# Patient Record
Sex: FEMALE | Race: OTHER
[De-identification: names, ages, dates, MRNs, and addresses within clinical notes are randomized per-mention and may not be internally consistent; named-entity substitution may affect disease eponyms.]

---

## 2017-03-27 ENCOUNTER — APPOINTMENT (OUTPATIENT)
Dept: SPINE | Facility: CLINIC | Age: 62
End: 2017-03-27

## 2017-03-27 VITALS
WEIGHT: 139 LBS | HEART RATE: 79 BPM | SYSTOLIC BLOOD PRESSURE: 136 MMHG | BODY MASS INDEX: 23.16 KG/M2 | DIASTOLIC BLOOD PRESSURE: 76 MMHG | HEIGHT: 65 IN

## 2017-03-27 DIAGNOSIS — Z86.69 PERSONAL HISTORY OF OTHER DISEASES OF THE NERVOUS SYSTEM AND SENSE ORGANS: ICD-10-CM

## 2017-03-27 DIAGNOSIS — M54.5 LOW BACK PAIN: ICD-10-CM

## 2017-03-27 DIAGNOSIS — Z86.39 PERSONAL HISTORY OF OTHER ENDOCRINE, NUTRITIONAL AND METABOLIC DISEASE: ICD-10-CM

## 2017-03-27 DIAGNOSIS — M41.9 SCOLIOSIS, UNSPECIFIED: ICD-10-CM

## 2017-03-27 RX ORDER — SENNOSIDES 8.6 MG
TABLET ORAL
Refills: 0 | Status: ACTIVE | COMMUNITY

## 2017-03-27 RX ORDER — CHOLECALCIFEROL (VITAMIN D3) 1250 MCG
1.25 MG CAPSULE ORAL
Refills: 0 | Status: ACTIVE | COMMUNITY

## 2017-03-27 RX ORDER — MULTIVIT-MIN/FOLIC/VIT K/LYCOP 400-300MCG
1000 TABLET ORAL
Refills: 0 | Status: ACTIVE | COMMUNITY

## 2018-04-12 ENCOUNTER — LABORATORY RESULT (OUTPATIENT)
Age: 63
End: 2018-04-12

## 2018-04-13 ENCOUNTER — APPOINTMENT (OUTPATIENT)
Dept: OBGYN | Facility: CLINIC | Age: 63
End: 2018-04-13
Payer: COMMERCIAL

## 2018-04-13 PROCEDURE — 99396 PREV VISIT EST AGE 40-64: CPT

## 2018-08-17 ENCOUNTER — APPOINTMENT (OUTPATIENT)
Dept: OBGYN | Facility: CLINIC | Age: 63
End: 2018-08-17

## 2018-10-01 ENCOUNTER — APPOINTMENT (OUTPATIENT)
Dept: OPHTHALMOLOGY | Facility: CLINIC | Age: 63
End: 2018-10-01

## 2018-10-03 ENCOUNTER — APPOINTMENT (OUTPATIENT)
Dept: OPHTHALMOLOGY | Facility: CLINIC | Age: 63
End: 2018-10-03
Payer: COMMERCIAL

## 2018-10-03 DIAGNOSIS — H02.402 UNSPECIFIED PTOSIS OF LEFT EYELID: ICD-10-CM

## 2018-10-03 PROCEDURE — 99203 OFFICE O/P NEW LOW 30 MIN: CPT

## 2019-06-20 ENCOUNTER — LABORATORY RESULT (OUTPATIENT)
Age: 64
End: 2019-06-20

## 2019-06-21 ENCOUNTER — APPOINTMENT (OUTPATIENT)
Dept: OBGYN | Facility: CLINIC | Age: 64
End: 2019-06-21
Payer: COMMERCIAL

## 2019-06-21 VITALS — DIASTOLIC BLOOD PRESSURE: 80 MMHG | BODY MASS INDEX: 23.46 KG/M2 | SYSTOLIC BLOOD PRESSURE: 125 MMHG | WEIGHT: 141 LBS

## 2019-06-21 PROCEDURE — 99396 PREV VISIT EST AGE 40-64: CPT

## 2019-06-21 NOTE — PHYSICAL EXAM
[Awake] : awake [Alert] : alert [Soft] : soft [Oriented x3] : oriented to person, place, and time [Normal] : uterus [Uterine Adnexae] : were not tender and not enlarged [No Bleeding] : there was no active vaginal bleeding [Acute Distress] : no acute distress [Mass] : no breast mass [Nipple Discharge] : no nipple discharge [Axillary LAD] : no axillary lymphadenopathy [Tender] : non tender

## 2019-06-21 NOTE — COUNSELING
[Nutrition] : nutrition [Breast Self Exam] : breast self exam [Exercise] : exercise [Vitamins/Supplements] : vitamins/supplements [Vaccines] : vaccines [STD (testing, results, tx)] : STD (testing, results, tx) [Weight Management] : weight management

## 2020-05-26 ENCOUNTER — APPOINTMENT (OUTPATIENT)
Dept: OBGYN | Facility: CLINIC | Age: 65
End: 2020-05-26

## 2020-06-24 ENCOUNTER — APPOINTMENT (OUTPATIENT)
Dept: OBGYN | Facility: CLINIC | Age: 65
End: 2020-06-24

## 2021-03-07 ENCOUNTER — LABORATORY RESULT (OUTPATIENT)
Age: 66
End: 2021-03-07

## 2021-03-08 ENCOUNTER — LABORATORY RESULT (OUTPATIENT)
Age: 66
End: 2021-03-08

## 2021-03-08 ENCOUNTER — APPOINTMENT (OUTPATIENT)
Dept: OBGYN | Facility: CLINIC | Age: 66
End: 2021-03-08
Payer: COMMERCIAL

## 2021-03-08 DIAGNOSIS — N39.0 URINARY TRACT INFECTION, SITE NOT SPECIFIED: ICD-10-CM

## 2021-03-08 PROCEDURE — 99396 PREV VISIT EST AGE 40-64: CPT

## 2021-03-08 PROCEDURE — 99212 OFFICE O/P EST SF 10 MIN: CPT | Mod: 25

## 2021-03-08 PROCEDURE — 99072 ADDL SUPL MATRL&STAF TM PHE: CPT

## 2021-03-08 RX ORDER — MULTIVITAMIN/IRON/FOLIC ACID 18MG-0.4MG
600-400 TABLET ORAL
Qty: 60 | Refills: 10 | Status: ACTIVE | COMMUNITY
Start: 2021-03-08 | End: 1900-01-01

## 2021-03-08 NOTE — HISTORY OF PRESENT ILLNESS
[FreeTextEntry1] : pt comes for pap and mammogram . She has vaginal dryness , dysuria , frequency no hematuria fever or back pains for 5 days ,

## 2021-03-08 NOTE — COUNSELING
[Nutrition/ Exercise/ Weight Management] : nutrition, exercise, weight management [Body Image] : body image [Vitamins/Supplements] : vitamins/supplements [Vaccines] : vaccines

## 2022-03-10 ENCOUNTER — LABORATORY RESULT (OUTPATIENT)
Age: 67
End: 2022-03-10

## 2022-03-11 ENCOUNTER — APPOINTMENT (OUTPATIENT)
Dept: OBGYN | Facility: CLINIC | Age: 67
End: 2022-03-11

## 2022-03-11 ENCOUNTER — APPOINTMENT (OUTPATIENT)
Dept: OBGYN | Facility: CLINIC | Age: 67
End: 2022-03-11
Payer: COMMERCIAL

## 2022-03-11 VITALS — BODY MASS INDEX: 23.46 KG/M2 | SYSTOLIC BLOOD PRESSURE: 151 MMHG | DIASTOLIC BLOOD PRESSURE: 88 MMHG | WEIGHT: 141 LBS

## 2022-03-11 PROCEDURE — 99072 ADDL SUPL MATRL&STAF TM PHE: CPT

## 2022-03-11 PROCEDURE — 99397 PER PM REEVAL EST PAT 65+ YR: CPT

## 2022-06-22 ENCOUNTER — APPOINTMENT (OUTPATIENT)
Dept: NEUROSURGERY | Facility: CLINIC | Age: 67
End: 2022-06-22
Payer: COMMERCIAL

## 2022-06-22 VITALS
DIASTOLIC BLOOD PRESSURE: 81 MMHG | HEIGHT: 65 IN | WEIGHT: 138 LBS | SYSTOLIC BLOOD PRESSURE: 137 MMHG | HEART RATE: 81 BPM | BODY MASS INDEX: 22.99 KG/M2 | TEMPERATURE: 98.4 F

## 2022-06-22 PROCEDURE — 99244 OFF/OP CNSLTJ NEW/EST MOD 40: CPT

## 2022-06-29 NOTE — HISTORY OF PRESENT ILLNESS
[FreeTextEntry1] : Incidental right ICA aneurysm measuring 3-4 mm in size [de-identified] : 67 year old healthy female presents to the office for a neurosurgical consultation for an incidental right ICA aneurysm found on MRI/A imaging. Patient has complaints of headaches which occur for 1-2 weeks at a time. She consulted with a neurologist, Dr. Case,  who ordered MRI/A which demonstrated a 3-4 mm right ICA aneurysm. \par She states there are weeks she is headache free and denies other neurological symptoms. \par PMH Herniated disc, headaches, palpations, \par PSH none\par ALL ASA\par non smoker\par No family history of cerebral aneurysms\par \par Plan: Cerebral angiogram by Dr. LONDON Mccartney end of July\par

## 2022-06-29 NOTE — ASSESSMENT
[FreeTextEntry1] : 2022\par \par \par \par Re:	Mily Brody \par :	1955\par \par The patient is a 67-year-old right-handed healthy female who has had a history of ongoing headaches.  She was seen by a neurologist in [place] who imaged the patient.  The MRI was unremarkable, and the MRA shows a 3-4 mm right-sided supraclinoid aneurysm.  She was sent to a neurosurgeon in [place] who said she had 2 aneurysms, and she is here for my opinion.\par \par Her past medical history is positive for back issues.  She has a history of a murmur and palpitations and has had a cardiac catheterization.  Past surgical history is negative.  She does not smoke.  Family history is negative, and she is allergic to aspirin.  She has a normal neurologic exam.\par \par I reviewed the images carefully, and on the right side there is a small aneurysm of approximately 4 mm in the ophthalmic segment, perhaps a little distal to the ophthalmic.  I see no other aneurysm.  She claims the other neurosurgeon saw a 2nd aneurysm.  I am not clear on this.\par \par IMPRESSION: Given her excellent health and the size of this aneurysm and the question of multiplicity, I think a cerebral angiogram should be performed to completely delineate the lesion and lead to decision making.  She may be in a gray zone for treatment, but I would like to see a cerebral angiogram.  The patient will be seen by Dr. Mccartney, and we are arranging outpatient angiography.\par \par \par \par Jeffry Zhao M.D., F.A.C.S.\par [facility]\par \par \par \par

## 2022-09-14 ENCOUNTER — APPOINTMENT (OUTPATIENT)
Dept: NEUROSURGERY | Facility: CLINIC | Age: 67
End: 2022-09-14

## 2022-09-14 PROCEDURE — 99202 OFFICE O/P NEW SF 15 MIN: CPT | Mod: 95

## 2022-09-14 NOTE — HISTORY OF PRESENT ILLNESS
[de-identified] : SNEHA WILLIS is a 67 year old right handed female referred by Dr. Zhao for neurovascular evaluation and possible diagnostic cerebral angiogram. She has a PMH of herniated disc, chronic headaches, heart murmur and palpitations, s/p cardiac catheterization per patient. Denies known family history of cerebral aneurysms. She initially presented with headaches for 1-2 weeks at a time. She saw neurologist, Dr. Case who ordered MRI/MRA which demonstrated incidental 3-4mm right ICA aneurysm. She saw neurosurgeon, Dr. Garcia in [facility], and was told she has 2 aneurysms and she was recommended surgery. She presented to Dr. Zhao in June 2022 for a second opinion who confirms the presence of one aneurysm, unclear about the alleged second aneurysm.

## 2022-09-14 NOTE — REASON FOR VISIT
[Home] : at home, [unfilled] , at the time of the visit. [Medical Office: ([facility])___] : at the medical office located in  [Family Member] : family member [Patient] : the patient [Self] : self [This encounter was initiated by telehealth (audio with video) and converted to telephone (audio only) due to technical difficulties.] : This encounter was initiated by telehealth (audio with video) and converted to telephone (audio only) due to technical difficulties. [New Patient Visit] : a new patient visit [Referred By: _________] : Patient was referred by BARRIE

## 2022-09-14 NOTE — ASSESSMENT
[FreeTextEntry1] : IMPRESSION:\par 67F with PMH of herniated disc, reported h/o coronary angiogram in the past, p/w headaches, underwent MRI/MRA imaging ordered by neurologist, Dr. Case, incidentally found 3-4mm right ICA aneurysm. Consulted with Dr. Zhao in June 2022. \par \par Recommend diagnostic cerebral angiogram as the initial step to evaluate unruptured aneurysm. The risks, benefits, alternatives, complications and personnel associated with the procedure were discussed with the patient and family in great detail. They request that we proceed. \par \par \par PLAN: \par Diagnostic Cerebral Angiogram scheduled for 9/20/22 at [facility]\par

## 2022-09-16 ENCOUNTER — OUTPATIENT (OUTPATIENT)
Dept: OUTPATIENT SERVICES | Facility: HOSPITAL | Age: 67
LOS: 1 days | End: 2022-09-16
Payer: COMMERCIAL

## 2022-09-16 ENCOUNTER — RESULT REVIEW (OUTPATIENT)
Age: 67
End: 2022-09-16

## 2022-09-16 VITALS
OXYGEN SATURATION: 95 % | TEMPERATURE: 97 F | SYSTOLIC BLOOD PRESSURE: 120 MMHG | DIASTOLIC BLOOD PRESSURE: 80 MMHG | HEIGHT: 65 IN | WEIGHT: 134.92 LBS | RESPIRATION RATE: 16 BRPM | HEART RATE: 54 BPM

## 2022-09-16 DIAGNOSIS — Z98.890 OTHER SPECIFIED POSTPROCEDURAL STATES: Chronic | ICD-10-CM

## 2022-09-16 DIAGNOSIS — Z01.818 ENCOUNTER FOR OTHER PREPROCEDURAL EXAMINATION: ICD-10-CM

## 2022-09-16 DIAGNOSIS — I67.1 CEREBRAL ANEURYSM, NONRUPTURED: ICD-10-CM

## 2022-09-16 DIAGNOSIS — Z29.9 ENCOUNTER FOR PROPHYLACTIC MEASURES, UNSPECIFIED: ICD-10-CM

## 2022-09-16 DIAGNOSIS — Z90.89 ACQUIRED ABSENCE OF OTHER ORGANS: Chronic | ICD-10-CM

## 2022-09-16 LAB
A1C WITH ESTIMATED AVERAGE GLUCOSE RESULT: 5.6 % — SIGNIFICANT CHANGE UP (ref 4–5.6)
ALBUMIN SERPL ELPH-MCNC: 4.2 G/DL — SIGNIFICANT CHANGE UP (ref 3.3–5.2)
ALP SERPL-CCNC: 50 U/L — SIGNIFICANT CHANGE UP (ref 40–120)
ALT FLD-CCNC: 15 U/L — SIGNIFICANT CHANGE UP
ANION GAP SERPL CALC-SCNC: 10 MMOL/L — SIGNIFICANT CHANGE UP (ref 5–17)
APTT BLD: 31.6 SEC — SIGNIFICANT CHANGE UP (ref 27.5–35.5)
AST SERPL-CCNC: 22 U/L — SIGNIFICANT CHANGE UP
BASOPHILS # BLD AUTO: 0.02 K/UL — SIGNIFICANT CHANGE UP (ref 0–0.2)
BASOPHILS NFR BLD AUTO: 0.4 % — SIGNIFICANT CHANGE UP (ref 0–2)
BILIRUB SERPL-MCNC: 0.3 MG/DL — LOW (ref 0.4–2)
BLD GP AB SCN SERPL QL: SIGNIFICANT CHANGE UP
BUN SERPL-MCNC: 13.5 MG/DL — SIGNIFICANT CHANGE UP (ref 8–20)
CALCIUM SERPL-MCNC: 9.1 MG/DL — SIGNIFICANT CHANGE UP (ref 8.4–10.5)
CHLORIDE SERPL-SCNC: 104 MMOL/L — SIGNIFICANT CHANGE UP (ref 98–107)
CO2 SERPL-SCNC: 27 MMOL/L — SIGNIFICANT CHANGE UP (ref 22–29)
CREAT SERPL-MCNC: 0.58 MG/DL — SIGNIFICANT CHANGE UP (ref 0.5–1.3)
EGFR: 100 ML/MIN/1.73M2 — SIGNIFICANT CHANGE UP
EOSINOPHIL # BLD AUTO: 0.07 K/UL — SIGNIFICANT CHANGE UP (ref 0–0.5)
EOSINOPHIL NFR BLD AUTO: 1.4 % — SIGNIFICANT CHANGE UP (ref 0–6)
ESTIMATED AVERAGE GLUCOSE: 114 MG/DL — SIGNIFICANT CHANGE UP (ref 68–114)
GLUCOSE SERPL-MCNC: 87 MG/DL — SIGNIFICANT CHANGE UP (ref 70–99)
HCT VFR BLD CALC: 42 % — SIGNIFICANT CHANGE UP (ref 34.5–45)
HGB BLD-MCNC: 13.9 G/DL — SIGNIFICANT CHANGE UP (ref 11.5–15.5)
IMM GRANULOCYTES NFR BLD AUTO: 0.2 % — SIGNIFICANT CHANGE UP (ref 0–0.9)
INR BLD: 1.03 RATIO — SIGNIFICANT CHANGE UP (ref 0.88–1.16)
LYMPHOCYTES # BLD AUTO: 1.84 K/UL — SIGNIFICANT CHANGE UP (ref 1–3.3)
LYMPHOCYTES # BLD AUTO: 36.4 % — SIGNIFICANT CHANGE UP (ref 13–44)
MCHC RBC-ENTMCNC: 30.5 PG — SIGNIFICANT CHANGE UP (ref 27–34)
MCHC RBC-ENTMCNC: 33.1 GM/DL — SIGNIFICANT CHANGE UP (ref 32–36)
MCV RBC AUTO: 92.3 FL — SIGNIFICANT CHANGE UP (ref 80–100)
MONOCYTES # BLD AUTO: 0.35 K/UL — SIGNIFICANT CHANGE UP (ref 0–0.9)
MONOCYTES NFR BLD AUTO: 6.9 % — SIGNIFICANT CHANGE UP (ref 2–14)
MRSA PCR RESULT.: SIGNIFICANT CHANGE UP
NEUTROPHILS # BLD AUTO: 2.77 K/UL — SIGNIFICANT CHANGE UP (ref 1.8–7.4)
NEUTROPHILS NFR BLD AUTO: 54.7 % — SIGNIFICANT CHANGE UP (ref 43–77)
PLATELET # BLD AUTO: 256 K/UL — SIGNIFICANT CHANGE UP (ref 150–400)
POTASSIUM SERPL-MCNC: 3.8 MMOL/L — SIGNIFICANT CHANGE UP (ref 3.5–5.3)
POTASSIUM SERPL-SCNC: 3.8 MMOL/L — SIGNIFICANT CHANGE UP (ref 3.5–5.3)
PROT SERPL-MCNC: 6.8 G/DL — SIGNIFICANT CHANGE UP (ref 6.6–8.7)
PROTHROM AB SERPL-ACNC: 11.9 SEC — SIGNIFICANT CHANGE UP (ref 10.5–13.4)
RBC # BLD: 4.55 M/UL — SIGNIFICANT CHANGE UP (ref 3.8–5.2)
RBC # FLD: 13.1 % — SIGNIFICANT CHANGE UP (ref 10.3–14.5)
S AUREUS DNA NOSE QL NAA+PROBE: SIGNIFICANT CHANGE UP
SARS-COV-2 RNA SPEC QL NAA+PROBE: SIGNIFICANT CHANGE UP
SODIUM SERPL-SCNC: 141 MMOL/L — SIGNIFICANT CHANGE UP (ref 135–145)
WBC # BLD: 5.06 K/UL — SIGNIFICANT CHANGE UP (ref 3.8–10.5)
WBC # FLD AUTO: 5.06 K/UL — SIGNIFICANT CHANGE UP (ref 3.8–10.5)

## 2022-09-16 PROCEDURE — 93005 ELECTROCARDIOGRAM TRACING: CPT

## 2022-09-16 PROCEDURE — 93010 ELECTROCARDIOGRAM REPORT: CPT

## 2022-09-16 PROCEDURE — 71046 X-RAY EXAM CHEST 2 VIEWS: CPT | Mod: 26

## 2022-09-16 PROCEDURE — 71046 X-RAY EXAM CHEST 2 VIEWS: CPT

## 2022-09-16 PROCEDURE — G0463: CPT

## 2022-09-16 RX ORDER — AMITRIPTYLINE HCL 25 MG
0 TABLET ORAL
Qty: 0 | Refills: 0 | DISCHARGE

## 2022-09-16 RX ORDER — PROPRANOLOL HCL 160 MG
0 CAPSULE, EXTENDED RELEASE 24HR ORAL
Qty: 0 | Refills: 0 | DISCHARGE

## 2022-09-16 RX ORDER — SODIUM CHLORIDE 9 MG/ML
3 INJECTION INTRAMUSCULAR; INTRAVENOUS; SUBCUTANEOUS ONCE
Refills: 0 | Status: DISCONTINUED | OUTPATIENT
Start: 2022-09-20 | End: 2022-10-04

## 2022-09-16 RX ORDER — MONTELUKAST 4 MG/1
0 TABLET, CHEWABLE ORAL
Qty: 0 | Refills: 0 | DISCHARGE

## 2022-09-16 NOTE — H&P PST ADULT - NSICDXPASTMEDICALHX_GEN_ALL_CORE_FT
PAST MEDICAL HISTORY:  Back pain     History of cerebral aneurysm     Lumbar herniated disc     Osteoporosis      PAST MEDICAL HISTORY:  Back pain     History of cerebral aneurysm     History of recurrent UTIs Currently on ABX therapy for 14 days    Lumbar herniated disc     Osteoporosis     Thyroid nodule

## 2022-09-16 NOTE — H&P PST ADULT - ASSESSMENT
CAPRINI VTE 2.0 SCORE [CLOT updated 2019]    AGE RELATED RISK FACTORS                                                       MOBILITY RELATED FACTORS  [ ] Age 41-60 years                                            (1 Point)                    [ ] Bed rest                                                        (1 Point)  [x ] Age: 61-74 years                                           (2 Points)                  [ ] Plaster cast                                                   (2 Points)  [ ] Age= 75 years                                              (3 Points)                    [ ] Bed bound for more than 72 hours                 (2 Points)    DISEASE RELATED RISK FACTORS                                               GENDER SPECIFIC FACTORS  [ ] Edema in the lower extremities                       (1 Point)              [ ] Pregnancy                                                     (1 Point)  [ ] Varicose veins                                               (1 Point)                     [ ] Post-partum < 6 weeks                                   (1 Point)             [x ] BMI > 25 Kg/m2                                            (1 Point)                     [ ] Hormonal therapy  or oral contraception          (1 Point)                 [ ] Sepsis (in the previous month)                        (1 Point)               [ ] History of pregnancy complications                 (1 point)  [ ] Pneumonia or serious lung disease                                               [ ] Unexplained or recurrent                     (1 Point)           (in the previous month)                               (1 Point)  [ ] Abnormal pulmonary function test                     (1 Point)                 SURGERY RELATED RISK FACTORS  [ ] Acute myocardial infarction                              (1 Point)               [ ]  Section                                             (1 Point)  [ ] Congestive heart failure (in the previous month)  (1 Point)      [ ] Minor surgery                                                  (1 Point)   [ ] Inflammatory bowel disease                             (1 Point)               [ ] Arthroscopic surgery                                        (2 Points)  [ ] Central venous access                                      (2 Points)                [ x] General surgery lasting more than 45 minutes (2 points)  [ ] Malignancy- Present or previous                   (2 Points)                [ ] Elective arthroplasty                                         (5 points)    [ ] Stroke (in the previous month)                          (5 Points)                                                                                                                                                           HEMATOLOGY RELATED FACTORS                                                 TRAUMA RELATED RISK FACTORS  [ ] Prior episodes of VTE                                     (3 Points)                [ ] Fracture of the hip, pelvis, or leg                       (5 Points)  [ ] Positive family history for VTE                         (3 Points)             [ ] Acute spinal cord injury (in the previous month)  (5 Points)  [ ] Prothrombin 11603 A                                     (3 Points)               [ ] Paralysis  (less than 1 month)                             (5 Points)  [ ] Factor V Leiden                                             (3 Points)                  [ ] Multiple Trauma within 1 month                        (5 Points)  [ ] Lupus anticoagulants                                     (3 Points)                                                           [ ] Anticardiolipin antibodies                               (3 Points)                                                       [ ] High homocysteine in the blood                      (3 Points)                                             [ ] Other congenital or acquired thrombophilia      (3 Points)                                                [ ] Heparin induced thrombocytopenia                  (3 Points)                                     Total Score [   5       ]  OPIOID RISK TOOL    KANCHAN EACH BOX THAT APPLIES AND ADD TOTALS AT THE END    FAMILY HISTORY OF SUBSTANCE ABUSE                 FEMALE         MALE                                                Alcohol                             [  ]1 pt          [  ]3pts                                               Illegal Durgs                     [  ]2 pts        [  ]3pts                                               Rx Drugs                           [  ]4 pts        [  ]4 pts    PERSONAL HISTORY OF SUBSTANCE ABUSE                                                                                          Alcohol                             [  ]3 pts       [  ]3 pts                                               Illegal Drugs                     [  ]4 pts        [  ]4 pts                                               Rx Drugs                           [  ]5 pts        [  ]5 pts    AGE BETWEEN 16-45 YEARS                                      [  ]1 pt         [  ]1 pt    HISTORY OF PREADOLESCENT   SEXUAL ABUSE                                                             [  ]3 pts        [  ]0pts    PSYCHOLOGICAL DISEASE                     ADD, OCD, Bipolar, Schizophrenia        [  ]2 pts         [  ]2 pts                      Depression                                               [  ]1 pt           [  ]1 pt           SCORING TOTAL   (add numbers and type here)              (**0*)                                     A score of 3 or lower indicated LOW risk for future opioid abuse  A score of 4 to 7 indicated moderate risk for future opioid abuse  A score of 8 or higher indicates a high risk for opioid abuse Pt is a 67 years old female, right hand dominant, seen today pre-op  for Cerebral angiogram. Pt medical hx includes OA, Osteoporosis, chronic back pain, cerebral aneurysm and headaches. Pt report incidental right ICA aneurysm found on MRI/A. Pt report occasional headaches which occurs for 1-2 weeks at times,  subsequently her neurologist Dr. Thom Case  ordered MRI/A which revealed a 3-4mm right ICA aneurysm. Pt states her last episode of headaches was  this morning at about 0400. Pt denies further symptoms of headaches at this time, denies syncope/ pre-syncope, no change visual disturbance, no nausea/emesis, no Fever/chills. Pt seen today for a scheduled procedure on 2022 with Dr. Bib Villalobos. Procedure protocol reviewed with Pt today. Pt had her COVID PCR done today in PST.       CAPRINI VTE 2.0 SCORE [CLOT updated 2019]    AGE RELATED RISK FACTORS                                                       MOBILITY RELATED FACTORS  [ ] Age 41-60 years                                            (1 Point)                    [ ] Bed rest                                                        (1 Point)  [x ] Age: 61-74 years                                           (2 Points)                  [ ] Plaster cast                                                   (2 Points)  [ ] Age= 75 years                                              (3 Points)                    [ ] Bed bound for more than 72 hours                 (2 Points)    DISEASE RELATED RISK FACTORS                                               GENDER SPECIFIC FACTORS  [ ] Edema in the lower extremities                       (1 Point)              [ ] Pregnancy                                                     (1 Point)  [ ] Varicose veins                                               (1 Point)                     [ ] Post-partum < 6 weeks                                   (1 Point)             [x ] BMI > 25 Kg/m2                                            (1 Point)                     [ ] Hormonal therapy  or oral contraception          (1 Point)                 [ ] Sepsis (in the previous month)                        (1 Point)               [ ] History of pregnancy complications                 (1 point)  [ ] Pneumonia or serious lung disease                                               [ ] Unexplained or recurrent                     (1 Point)           (in the previous month)                               (1 Point)  [ ] Abnormal pulmonary function test                     (1 Point)                 SURGERY RELATED RISK FACTORS  [ ] Acute myocardial infarction                              (1 Point)               [ ]  Section                                             (1 Point)  [ ] Congestive heart failure (in the previous month)  (1 Point)      [ ] Minor surgery                                                  (1 Point)   [ ] Inflammatory bowel disease                             (1 Point)               [ ] Arthroscopic surgery                                        (2 Points)  [ ] Central venous access                                      (2 Points)                [ x] General surgery lasting more than 45 minutes (2 points)  [ ] Malignancy- Present or previous                   (2 Points)                [ ] Elective arthroplasty                                         (5 points)    [ ] Stroke (in the previous month)                          (5 Points)                                                                                                                                                           HEMATOLOGY RELATED FACTORS                                                 TRAUMA RELATED RISK FACTORS  [ ] Prior episodes of VTE                                     (3 Points)                [ ] Fracture of the hip, pelvis, or leg                       (5 Points)  [ ] Positive family history for VTE                         (3 Points)             [ ] Acute spinal cord injury (in the previous month)  (5 Points)  [ ] Prothrombin 50304 A                                     (3 Points)               [ ] Paralysis  (less than 1 month)                             (5 Points)  [ ] Factor V Leiden                                             (3 Points)                  [ ] Multiple Trauma within 1 month                        (5 Points)  [ ] Lupus anticoagulants                                     (3 Points)                                                           [ ] Anticardiolipin antibodies                               (3 Points)                                                       [ ] High homocysteine in the blood                      (3 Points)                                             [ ] Other congenital or acquired thrombophilia      (3 Points)                                                [ ] Heparin induced thrombocytopenia                  (3 Points)                                     Total Score [   5       ]  OPIOID RISK TOOL    KANCHAN EACH BOX THAT APPLIES AND ADD TOTALS AT THE END    FAMILY HISTORY OF SUBSTANCE ABUSE                 FEMALE         MALE                                                Alcohol                             [  ]1 pt          [  ]3pts                                               Illegal Durgs                     [  ]2 pts        [  ]3pts                                               Rx Drugs                           [  ]4 pts        [  ]4 pts    PERSONAL HISTORY OF SUBSTANCE ABUSE                                                                                          Alcohol                             [  ]3 pts       [  ]3 pts                                               Illegal Drugs                     [  ]4 pts        [  ]4 pts                                               Rx Drugs                           [  ]5 pts        [  ]5 pts    AGE BETWEEN 16-45 YEARS                                      [  ]1 pt         [  ]1 pt    HISTORY OF PREADOLESCENT   SEXUAL ABUSE                                                             [  ]3 pts        [  ]0pts    PSYCHOLOGICAL DISEASE                     ADD, OCD, Bipolar, Schizophrenia        [  ]2 pts         [  ]2 pts                      Depression                                               [  ]1 pt           [  ]1 pt           SCORING TOTAL   (add numbers and type here)              (**0*)                                     A score of 3 or lower indicated LOW risk for future opioid abuse  A score of 4 to 7 indicated moderate risk for future opioid abuse  A score of 8 or higher indicates a high risk for opioid abuse

## 2022-09-16 NOTE — H&P PST ADULT - NSANTHOBSERVEDRD_ENT_A_CORE
----- Message from Santos Das MD sent at 12/12/2019  4:38 PM CST -----  Please inform the patient that her urinalysis does not show an infection, nor blood which should be indicative of stones.  Her kidney function is normal, her liver function is normal, and her blood sugar is again slightly elevated indicative of insulin sensitivity, and she should continue to try to stay active stay very well hydrated, and eat healthfully.  
Patient was informed with results and Dr. Das's suggestions. Patient verbalized understanding and has no questions.   
No

## 2022-09-16 NOTE — H&P PST ADULT - HISTORY OF PRESENT ILLNESS
Pt is a 67 years old female seen today pre-op  for Cerebral angiogram. Pt medical hx includes OA, Osteoporosis, chronic back pain, cerebral aneurysm. Pt report incidental right ICA aneurysm found on MRI/A. Pt report occasional headaches which occurs for 1-2 weeks at time, subsequently her neurologist ordered MRI/A which revealed a 3-4mm right ICA aneurysm. Pt states her last epsiode of headaches  Pt is a 67 years old female right hand dominant, seen today pre-op  for Cerebral angiogram. Pt medical hx includes OA, Osteoporosis, chronic back pain, cerebral aneurysm. Pt report incidental right ICA aneurysm found on MRI/A. Pt report occasional headaches which occurs for 1-2 weeks at time, subsequently her neurologist ordered MRI/A which revealed a 3-4mm right ICA aneurysm. Pt states her last episode of headaches this morning at about 0400. No syncope, no pre-syncope, no change visual disturbance  Pt is a 67 years old female, right hand dominant, seen today pre-op  for Cerebral angiogram. Pt medical hx includes OA, Osteoporosis, chronic back pain, Thyroid nodules, cerebral aneurysm and headaches. Pt report incidental right ICA aneurysm found on MRI/A. Pt report occasional headaches which occurs for 1-2 weeks at times,  subsequently her neurologist Dr. Thom Case  ordered MRI/A which revealed a 3-4mm right ICA aneurysm. Pt states her last episode of headaches was  this morning at about 0400. Pt denies further symptoms of headaches at this time, denies syncope/ pre-syncope, no change visual disturbance, no nausea/emesis, no Fever/chills. Pt seen today for a scheduled procedure on 9/2022 with Dr. Bib Villalobos.

## 2022-09-16 NOTE — H&P PST ADULT - NSANTHOSAYNRD_GEN_A_CORE
No. ASAD screening performed.  STOP BANG Legend: 0-2 = LOW Risk; 3-4 = INTERMEDIATE Risk; 5-8 = HIGH Risk

## 2022-09-16 NOTE — H&P PST ADULT - OTHER CARE PROVIDERS
Dr. Grijalva [phone], Nerologist Dr. Case Dr. Grijalva [phone], Neurologist Dr. Thom Case, Neurologist Dr. Jeffry Zhao

## 2022-09-16 NOTE — H&P PST ADULT - MUSCULOSKELETAL
details… normal/ROM intact/normal gait/strength 5/5 bilateral upper extremities/strength 5/5 bilateral lower extremities/back exam

## 2022-09-16 NOTE — H&P PST ADULT - GENITOURINARY COMMENTS
UTI. Pt currently on ABT Not assessed Dx with UTI, currently on antibiotics till date, no further symptoms of dysuria, frequency

## 2022-09-16 NOTE — H&P PST ADULT - NSICDXFAMILYHX_GEN_ALL_CORE_FT
FAMILY HISTORY:  Mother  Still living? No  FH: arterial aneurysm, Age at diagnosis: Age Unknown    Aunt  Still living? Unknown  FH: arterial aneurysm, Age at diagnosis: Age Unknown

## 2022-09-20 ENCOUNTER — TRANSCRIPTION ENCOUNTER (OUTPATIENT)
Age: 67
End: 2022-09-20

## 2022-09-20 ENCOUNTER — OUTPATIENT (OUTPATIENT)
Dept: OUTPATIENT SERVICES | Facility: HOSPITAL | Age: 67
LOS: 1 days | End: 2022-09-20
Payer: COMMERCIAL

## 2022-09-20 ENCOUNTER — APPOINTMENT (OUTPATIENT)
Dept: NEUROSURGERY | Facility: HOSPITAL | Age: 67
End: 2022-09-20

## 2022-09-20 VITALS
HEART RATE: 72 BPM | SYSTOLIC BLOOD PRESSURE: 122 MMHG | RESPIRATION RATE: 18 BRPM | DIASTOLIC BLOOD PRESSURE: 77 MMHG | OXYGEN SATURATION: 98 %

## 2022-09-20 VITALS
DIASTOLIC BLOOD PRESSURE: 84 MMHG | RESPIRATION RATE: 16 BRPM | SYSTOLIC BLOOD PRESSURE: 154 MMHG | TEMPERATURE: 98 F | OXYGEN SATURATION: 100 % | HEART RATE: 59 BPM

## 2022-09-20 DIAGNOSIS — Z90.89 ACQUIRED ABSENCE OF OTHER ORGANS: Chronic | ICD-10-CM

## 2022-09-20 DIAGNOSIS — I67.1 CEREBRAL ANEURYSM, NONRUPTURED: ICD-10-CM

## 2022-09-20 DIAGNOSIS — Z98.890 OTHER SPECIFIED POSTPROCEDURAL STATES: Chronic | ICD-10-CM

## 2022-09-20 PROCEDURE — 36224 PLACE CATH CAROTD ART: CPT

## 2022-09-20 PROCEDURE — C1887: CPT

## 2022-09-20 PROCEDURE — 76377 3D RENDER W/INTRP POSTPROCES: CPT | Mod: 26

## 2022-09-20 PROCEDURE — 36227 PLACE CATH XTRNL CAROTID: CPT

## 2022-09-20 PROCEDURE — 36226 PLACE CATH VERTEBRAL ART: CPT | Mod: 50

## 2022-09-20 PROCEDURE — 36226 PLACE CATH VERTEBRAL ART: CPT

## 2022-09-20 PROCEDURE — 36224 PLACE CATH CAROTD ART: CPT | Mod: 26

## 2022-09-20 PROCEDURE — C1894: CPT

## 2022-09-20 PROCEDURE — C1760: CPT

## 2022-09-20 PROCEDURE — C1769: CPT

## 2022-09-20 RX ORDER — SODIUM CHLORIDE 9 MG/ML
1000 INJECTION INTRAMUSCULAR; INTRAVENOUS; SUBCUTANEOUS
Refills: 0 | Status: DISCONTINUED | OUTPATIENT
Start: 2022-09-20 | End: 2022-10-04

## 2022-09-20 RX ADMIN — SODIUM CHLORIDE 100 MILLILITER(S): 9 INJECTION INTRAMUSCULAR; INTRAVENOUS; SUBCUTANEOUS at 15:54

## 2022-09-20 NOTE — DISCHARGE NOTE NURSING/CASE MANAGEMENT/SOCIAL WORK - PATIENT PORTAL LINK FT
You can access the FollowMyHealth Patient Portal offered by [facility] by registering at the following website: [email or web address]. By joining [facility]’s FollowMyHealth portal, you will also be able to view your health information using other applications (apps) compatible with our system.

## 2022-09-20 NOTE — CHART NOTE - NSCHARTNOTESELECT_GEN_ALL_CORE
Neurointerventional Surgery Pre Procedure Note/Event Note
Neurointerventional Surgery Post Procedure Note/Event Note

## 2022-09-20 NOTE — DISCHARGE NOTE NURSING/CASE MANAGEMENT/SOCIAL WORK - NSDCPEFALRISK_GEN_ALL_CORE
For information on Fall & Injury Prevention, visit: [email or web address] OR  [email or web address] OR  https://www.cdc.gov/steadi/patient.html

## 2022-09-20 NOTE — ASU DISCHARGE PLAN (ADULT/PEDIATRIC) - CARE PROVIDER_API CALL
Wilfredo Mccartney (MD; MS)  Unallocated  [address] [facility], [address] [place] [id number]  Phone: [phone]  Fax: [phone]  Follow Up Time: 2 weeks

## 2022-09-20 NOTE — CHART NOTE - NSCHARTNOTEFT_GEN_A_CORE
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 Bib SIERRA  Physician Assistant: Ashlie Crowder PA-C  Nurse: Elvia Avitia, Rina Stafford, Yogesh Curry  Anesthesiologist: Dr. Arias SIERRA       Radiology Tech: Wesley Oakes RT, Jean Paul Cornell RT, Clay Sage RT    Sheath:  5 [place] Sheath    I/Os: estimated blood loss less than 10cc,  IV fluids 300cc, Urine output 0cc, Contrast: Omnipaque 240  90 cc    Vitals:  /79   HR 68  Spo2 100 %    Preliminary Report:  Under a 5 [place]oc[place] short sheath via the right groin under MAC sedation via left vertebral artery, left internal carotid artery, left external carotid artery, right vertebral artery, right internal carotid artery, right external carotid artery, a selective cerebral angiography  was performed and reveals FMD, 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 ( Official note to follow).    Patient tolerated procedure well.  Patient remains hemodynamically stable, no change in neurological status compared to baseline.  Results were discussed with patient, patient's family and Neurosurgery.  Right groin sheath  was discontinued using Vascade closure device. Hemostasis was obtained with approximately 10 minutes of manual compression.     No active bleeding, no hematoma, no ecchymosis.   Quick clot and safeguard balloon dressing applied at 1425.  Patient transferred to recovery room in stable condition.

## 2022-09-20 NOTE — CHART NOTE - NSCHARTNOTEFT_GEN_A_CORE
Neurointerventional Surgery  Pre-Procedure Note     This is a 67y Female        HPI:  67F R hand dominant female with PMH OA, osteoporosis, chronic back pain, thyroid nodules, and headaches who presents today for cerebral angiogram. Patient reports that she has history of headaches which occur for 1-2 weeks at a time. Patient sought care from a neurologist who ordered and MRI/MRA which found a 3-4 mm R ICA aneurysm. Patient presents today for diagnostic angiogram. Patient has no acute complaints at this time.       Allergies: aspirin (Short breath)      PAST MEDICAL & SURGICAL HISTORY:  Lumbar herniated disc  Osteoporosis  Back pain  History of cerebral aneurysm  Thyroid nodule  History of recurrent UTIs, Currently on ABX therapy for 14 days  H/O coronary angiogram  History of tonsillectomy        FAMILY HISTORY:  FH: arterial aneurysm (Mother, Aunt)      Current Medications:   sodium chloride 0.9% lock flush 3 milliLiter(s) IV Push Once      Physical Exam:  Constitutional: NAD  Neuro  * Mental Status:  GCS 15:  E(4), V(5), M(6).  Awake, alert, oriented to conversation. No aphasia or dysarthria. Able to name objects and their function.   * Cranial Nerves: Cnii-Cnxii grossly intact. PERRL, EOMI, tongue midline, no gaze deviation  * Motor: RUE 5/5, LUE 5/5, RLE 5/5, LLE 5/5, no drift or dysmetria   * Sensory: Sensation intact to light touch  * Reflexes: Not assessed  * Gait: Not assessed  Cardiovascular:  Regular rate and rhythm.  Eyes: See neurologic examination with detailed examination of eyes.  ENT: No JVD, Trachea Midline.  Respiratory: non labored breathing   Gastrointestinal: Soft, nontender, nondistended.  Genitourinary: [ ] Ty, [ x ] No Ty.   Musculoskeletal: No muscle wasting noted, (See neurologic assessment for full muscle strength assessment) No pretibial edema appreciated, no appreciable calf tenderness.  Skin: no wounds, abrasions noted   Hematologic / Lymph / Immunologic: No bleeding from IV sites or wounds, No lymphadenopathy, No Hives or allergic type skin lesions      NIH SS:  DATE: 9/20/22  TIME: 1240   1A: Level of consciousness (0-3): 0  1B: Questions (0-2): 0    1C: Commands (0-2): 0  2: Gaze (0-2): 0  3: Visual fields (0-3): 0  4: Facial palsy (0-3): 0  MOTOR:  5A: Left arm motor drift (0-4): 0  5B: Right arm motor drift (0-4): 0  6A: Left leg motor drift (0-4): 0  6B: Right leg motor drift (0-4): 0  7: Limb ataxia (0-2): 0  SENSORY:  8: Sensation (0-2): 0  SPEECH:  9: Language (0-3): 0  10: Dysarthria (0-2): 0  EXTINCTION:  11: Extinction/inattention (0-2): 0    TOTAL SCORE: 4      Labs:   9/16/22: WBC 5.1, Hgb 13.9, Hct 42, platelet 256  9/16/22: Na 141, K 3.8, Cl 104, CO2 27, BUN 13.5, Cr 0.58, glucose 87  9/16/22: PTT 31.6, INR 1.03       Assessment/Plan:   This is a 67y  year old right hand dominant Female  presents with R ICA aneurysm. Patient presents to neuro-IR for selective cerebral angiography.     Procedure, goals, risks, benefits and alternatives  were discussed with patient.  All questions were answered.  Risks include but are not limited to stroke, vessel injury, hemorrhage, and or groin hematoma.  Patient demonstrates understanding  of all risks involved with this procedure and wishes to continue.   Appropriate  consent was obtained from patient and consent is in the patient's chart. Neurointerventional Surgery  Pre-Procedure Note     This is a 67y Female        HPI:  67F R hand dominant female with PMH OA, osteoporosis, chronic back pain, thyroid nodules, and headaches who presents today for cerebral angiogram. Patient reports that she has history of headaches which occur for 1-2 weeks at a time. Patient sought care from a neurologist who ordered and MRI/MRA which found a 3-4 mm R ICA aneurysm. Patient presents today for diagnostic angiogram. Patient has no acute complaints at this time.       Allergies: aspirin (Short breath)      PAST MEDICAL & SURGICAL HISTORY:  Lumbar herniated disc  Osteoporosis  Back pain  History of cerebral aneurysm  Thyroid nodule  History of recurrent UTIs, Currently on ABX therapy for 14 days  H/O coronary angiogram  History of tonsillectomy        FAMILY HISTORY:  FH: arterial aneurysm (Mother, Aunt)      Current Medications:   sodium chloride 0.9% lock flush 3 milliLiter(s) IV Push Once      Physical Exam:  Constitutional: NAD  Neuro  * Mental Status:  GCS 15:  E(4), V(5), M(6).  Awake, alert, oriented to conversation. No aphasia or dysarthria. Able to name objects and their function.   * Cranial Nerves: Cnii-Cnxii grossly intact. PERRL, EOMI, tongue midline, no gaze deviation  * Motor: RUE 5/5, LUE 5/5, RLE 5/5, LLE 5/5, no drift or dysmetria   * Sensory: Sensation intact to light touch  * Reflexes: Not assessed  * Gait: Not assessed  Cardiovascular:  Regular rate and rhythm.  Eyes: See neurologic examination with detailed examination of eyes.  ENT: No JVD, Trachea Midline.  Respiratory: non labored breathing   Gastrointestinal: Soft, nontender, nondistended.  Genitourinary: [ ] Ty, [ x ] No Ty.   Musculoskeletal: No muscle wasting noted, (See neurologic assessment for full muscle strength assessment) No pretibial edema appreciated, no appreciable calf tenderness.  Skin: no wounds, abrasions noted   Hematologic / Lymph / Immunologic: No bleeding from IV sites or wounds, No lymphadenopathy, No Hives or allergic type skin lesions      NIH SS:  DATE: 9/20/22  TIME: 1240   1A: Level of consciousness (0-3): 0  1B: Questions (0-2): 0    1C: Commands (0-2): 0  2: Gaze (0-2): 0  3: Visual fields (0-3): 0  4: Facial palsy (0-3): 0  MOTOR:  5A: Left arm motor drift (0-4): 0  5B: Right arm motor drift (0-4): 0  6A: Left leg motor drift (0-4): 0  6B: Right leg motor drift (0-4): 0  7: Limb ataxia (0-2): 0  SENSORY:  8: Sensation (0-2): 0  SPEECH:  9: Language (0-3): 0  10: Dysarthria (0-2): 0  EXTINCTION:  11: Extinction/inattention (0-2): 0    TOTAL SCORE: 0      Labs:   9/16/22: WBC 5.1, Hgb 13.9, Hct 42, platelet 256  9/16/22: Na 141, K 3.8, Cl 104, CO2 27, BUN 13.5, Cr 0.58, glucose 87  9/16/22: PTT 31.6, INR 1.03       Assessment/Plan:   This is a 67y  year old right hand dominant Female  presents with R ICA aneurysm. Patient presents to neuro-IR for selective cerebral angiography.     Procedure, goals, risks, benefits and alternatives  were discussed with patient.  All questions were answered.  Risks include but are not limited to stroke, vessel injury, hemorrhage, and or groin hematoma.  Patient demonstrates understanding  of all risks involved with this procedure and wishes to continue.   Appropriate  consent was obtained from patient and consent is in the patient's chart.

## 2022-09-29 PROBLEM — M51.26 OTHER INTERVERTEBRAL DISC DISPLACEMENT, LUMBAR REGION: Chronic | Status: ACTIVE | Noted: 2022-09-16

## 2022-09-29 PROBLEM — Z86.79 PERSONAL HISTORY OF OTHER DISEASES OF THE CIRCULATORY SYSTEM: Chronic | Status: ACTIVE | Noted: 2022-09-16

## 2022-09-29 PROBLEM — E04.1 NONTOXIC SINGLE THYROID NODULE: Chronic | Status: ACTIVE | Noted: 2022-09-16

## 2022-09-29 PROBLEM — M54.9 DORSALGIA, UNSPECIFIED: Chronic | Status: ACTIVE | Noted: 2022-09-16

## 2022-09-29 PROBLEM — M81.0 AGE-RELATED OSTEOPOROSIS WITHOUT CURRENT PATHOLOGICAL FRACTURE: Chronic | Status: ACTIVE | Noted: 2022-09-16

## 2022-10-04 ENCOUNTER — APPOINTMENT (OUTPATIENT)
Dept: NEUROSURGERY | Facility: CLINIC | Age: 67
End: 2022-10-04

## 2022-10-04 VITALS
TEMPERATURE: 98.1 F | HEART RATE: 77 BPM | HEIGHT: 65 IN | DIASTOLIC BLOOD PRESSURE: 84 MMHG | SYSTOLIC BLOOD PRESSURE: 133 MMHG | BODY MASS INDEX: 22.16 KG/M2 | OXYGEN SATURATION: 96 % | WEIGHT: 133 LBS

## 2022-10-04 PROCEDURE — 99215 OFFICE O/P EST HI 40 MIN: CPT

## 2022-10-04 PROCEDURE — ZZZZZ: CPT

## 2022-10-05 NOTE — ASSESSMENT
[FreeTextEntry1] : IMPRESSION:\par 67F referred by Dr Zhao, [facility] of herniated disc, reported h/o coronary angiogram in the past, p/w headaches, underwent MRI/MRA imaging ordered by neurologist, Dr. Case, incidentally found 3-4mm right ICA aneurysm. s/p dx angio at [facility] on 9/20 demonstrating bilateral ophthalmic artery aneurysms R 3.2 x 2.9mm, L 4.1 x 3.4mm, 2 right cavernous carotid aneurysms 2.4mm and 1.9mm, and severe fibromuscular dysplasia involving the bilateral cervical internal carotid and vertebral arteries.\par \par Today along with Dr. Zhao we discussed the findings of the angiogram and the treatment options for her aneurysms. We explained that particularly on the left side, the size and morphology of the aneurysm warrants treatment. We discussed the various options of surgical clipping vs endovascular remodeling with flow diverting stents, and recommended the latter. The FMD can be treated conservatively with antiplatelet medication. \par \par Given her family history of thoracic and aortic dissections/aneurysms, will obtain a screening CTA of the chest and abdomen. She also reported a possible allergy to aspirin, but saw an allergist and found that she was not in fact allergic. \par \par \par PLAN:\par - Plan for eventual treatment of the left ophthalmic aneurysm with flow diverting stent\par - CTA chest and abdomen \par - Patient will obtain the note from the allergist to ensure that she does not have an allergy to aspirin \par - When patient is ready to proceed, will prescribe aspirin 81mg daily and brilinta 90mg bid 1 week prior to procedure \par

## 2022-10-05 NOTE — HISTORY OF PRESENT ILLNESS
[FreeTextEntry1] : SNEHA WILLIS is a 67 year old right handed female referred by Dr. Zhao, [facility] of herniated disc, chronic headaches, heart murmur and palpitations, s/p cardiac catheterization per patient. Denies known family history of cerebral aneurysms. She initially presented with headaches for 1-2 weeks at a time. She saw neurologist, Dr. Case who ordered MRI/MRA which demonstrated incidental right ICA aneurysm. She saw neurosurgeon, Dr. Garcia in [facility], and was told she has 2 aneurysms and she was recommended surgery. She presented to Dr. Zhao in June 2022 for a second opinion who confirms the presence of one aneurysm, unclear about the alleged second aneurysm. \par \par On 9/20, she underwent diagnostic cerebral angiogram which demonstrates left ophthalmic artery aneurysm 4.1 x 3.4mm, right ophthalmic artery aneurysm 3.2 x 2.9mm, 2 small R ICA cavernous aneurysms 2.4mm and 1.9mm, severe fibromuscular dysplasia within the cervical segments of the bilateral internal carotid arteries with aneurysmal dilatation measuring as wide as 1.6 cm on the right and 1.2 cm on the left, and moderate fibromuscular dysplasia involving the bilateral vertebral arteries.

## 2022-10-05 NOTE — REASON FOR VISIT
[Post Hospitalization] : a post hospitalization visit [FreeTextEntry1] : s/p Diagnostic Catheter Cerebral Angiogram 9/20/22

## 2022-10-07 ENCOUNTER — RESULT REVIEW (OUTPATIENT)
Age: 67
End: 2022-10-07

## 2022-10-10 NOTE — PHYSICAL EXAM
[FreeTextEntry1] : Awake, alert, and oriented x 3. VSS. In no apparent distress.   Short and long term memory intact.  Speech is clear and appropriate.  Affect is normal.  Voice is strong.  Respirations easy and even.  Normal skin color and pigmentation.  The sclera and conjunctiva normal.  Ears, nose, and neck normal in appearance.  EOMI, no nystagmus, facial sensation intact symmetrically, face symmetrical, hearing intact bilaterally, tongue and palate midline, head turning and shoulder shrug symmetric and no tongue deviation with protrusion.  No pronator drift.   No past-pointing, no tremors noted, no dysdiadochokinesia, and finger to nose dysmetria was not present.  Romberg negative.  \par Right hand dominant.\par Rises from a seated position in a comfortable fashion.\par \par Gait is well coordinated and stable without the use of an assistive device.   Able to perform tandem walk without loss of balance.   Motor strength in the upper extremities 5/5 in the biceps, triceps, and hand .  Motor strength in the lower extremities is 5/5 in the iliopsoas, quadriceps, and hamstrings.  \par \par

## 2022-10-10 NOTE — ASSESSMENT
Patient called 11/27/19 to kristine appt 11/26/19 to 12/02/19.   [FreeTextEntry1] : 2022\par \par \par \par Re:	Mily Brody \par :	1955\par \par The patient underwent cerebral angiogram with Dr. Wilfredo Mccartney on 2022.  I reviewed these images carefully.  There is fibromuscular dysplasia in the carotid artery in the neck, right greater than left.  There are 2 ophthalmic aneurysms.  The left side is a double-domed aneurysm that is slightly bigger than the right side, and there are 2 small cavernous aneurysms on the right ICA as well measuring 2.4 and \par 1.9 mm.  I reviewed the images and the treatment plan with Dr. Mccartney today, and it is our combined opinion that at least the aneurysm on the left side should be treated due to its size, shape, morphology, and her good health and age, and additionally we feel that the modality of treatment should be endovascular with a flow-diverting stent or stents.  If she does well after this, we will then reconvene and potentially address the right-sided lesion.  \par \par We met with the patient today and outlined to her all the indications, risks, and possible complications of this treatment, and we stressed we feel these are aneurysms that should be treated through an endovascular approach.  All appropriate questions were asked and answered.  Additionally, we have ordered a CT scan with contrast of the chest and the abdomen, as there is a history of aortic dissection and abdominal aortic aneurysm in the family.  Finally, there is a question of an aspirin allergy, and we will sort this out prior to any treatment.\par \par In summary, we feel that the patient is a candidate for treatment of initially the left-sided ophthalmic region aneurysm with a flow-diverting stent.  The patient may wait until after the holidays to have this done, but it will be done with Dr. Mccartney here at [facility].\par \par \par \par Jeffry Zhao M.D., F.A.C.S.\par [facility]\par

## 2022-10-10 NOTE — HISTORY OF PRESENT ILLNESS
[FreeTextEntry1] : Ms. Mily Brody presents with her family member to discuss treatment options for both left and right opthalmic artery aneurysms demonstrated on cerebral angiogram.\par We initially saw Ms. Brody in June 2022 with MRI/A imaging showing right ICA aneurysm. She has history of migraines and complaints of severe headache which led to imaging. \par Formal cerebral angiogram performed on 9/20/2022 by Dr. Mccartney showed left opthalmic artery aneurysm measuring 4.1 x 3.4 mm, right opthalmic artery measuring 3.2 mm x 2.9 mm, small right ICA aneurysms and FMD cervical segment so the ICA with aneurysmal dilatation measuring 1.6 cm on the right and 1.2 cm on the left. \par Patient denies family history of cerebral aneurysms and is non smoker. \par Patient was allergic to ASA as a child. She was recently tested for allergy for ASA and states she is no longer allergic. She will fax over results to confirm allergy. \par \par Plan:\par Treat left opthalmic artery aneurysm via endovascular route with stent placement. \par Prepare with antiplatelet and ASA prior\par QUESTIONABLE ALLERGY TO ASA- patient will email allergy test to confirm\par Wait approximately 3 months, obtain brain MRA [facility] for treated left opthalmic artery aneurysm \par BP control\par Chest abd w wo contrast to r/o AAA.\par Cardiologist  [place]- patient does not know name

## 2022-10-12 ENCOUNTER — RESULT REVIEW (OUTPATIENT)
Age: 67
End: 2022-10-12

## 2022-10-19 ENCOUNTER — APPOINTMENT (OUTPATIENT)
Dept: CT IMAGING | Facility: IMAGING CENTER | Age: 67
End: 2022-10-19

## 2022-10-19 ENCOUNTER — OUTPATIENT (OUTPATIENT)
Dept: OUTPATIENT SERVICES | Facility: HOSPITAL | Age: 67
LOS: 1 days | End: 2022-10-19
Payer: COMMERCIAL

## 2022-10-19 DIAGNOSIS — Z98.890 OTHER SPECIFIED POSTPROCEDURAL STATES: Chronic | ICD-10-CM

## 2022-10-19 DIAGNOSIS — Z90.89 ACQUIRED ABSENCE OF OTHER ORGANS: Chronic | ICD-10-CM

## 2022-10-19 DIAGNOSIS — I67.1 CEREBRAL ANEURYSM, NONRUPTURED: ICD-10-CM

## 2022-10-19 PROCEDURE — 71275 CT ANGIOGRAPHY CHEST: CPT

## 2022-10-19 PROCEDURE — 74175 CTA ABDOMEN W/CONTRAST: CPT | Mod: 26

## 2022-10-19 PROCEDURE — 74175 CTA ABDOMEN W/CONTRAST: CPT

## 2022-10-19 PROCEDURE — 71275 CT ANGIOGRAPHY CHEST: CPT | Mod: 26

## 2022-11-25 ENCOUNTER — OUTPATIENT (OUTPATIENT)
Dept: OUTPATIENT SERVICES | Facility: HOSPITAL | Age: 67
LOS: 1 days | End: 2022-11-25

## 2022-11-25 VITALS
DIASTOLIC BLOOD PRESSURE: 100 MMHG | HEART RATE: 68 BPM | OXYGEN SATURATION: 98 % | WEIGHT: 139.77 LBS | TEMPERATURE: 98 F | RESPIRATION RATE: 16 BRPM | SYSTOLIC BLOOD PRESSURE: 138 MMHG | HEIGHT: 64 IN

## 2022-11-25 DIAGNOSIS — Z29.9 ENCOUNTER FOR PROPHYLACTIC MEASURES, UNSPECIFIED: ICD-10-CM

## 2022-11-25 DIAGNOSIS — I67.1 CEREBRAL ANEURYSM, NONRUPTURED: ICD-10-CM

## 2022-11-25 DIAGNOSIS — Z90.89 ACQUIRED ABSENCE OF OTHER ORGANS: Chronic | ICD-10-CM

## 2022-11-25 DIAGNOSIS — I10 ESSENTIAL (PRIMARY) HYPERTENSION: ICD-10-CM

## 2022-11-25 DIAGNOSIS — Z98.890 OTHER SPECIFIED POSTPROCEDURAL STATES: Chronic | ICD-10-CM

## 2022-11-25 DIAGNOSIS — Z01.818 ENCOUNTER FOR OTHER PREPROCEDURAL EXAMINATION: ICD-10-CM

## 2022-11-25 LAB
ALBUMIN SERPL ELPH-MCNC: 3.9 G/DL — SIGNIFICANT CHANGE UP (ref 3.3–5.2)
ALP SERPL-CCNC: 57 U/L — SIGNIFICANT CHANGE UP (ref 40–120)
ALT FLD-CCNC: 13 U/L — SIGNIFICANT CHANGE UP
ANION GAP SERPL CALC-SCNC: 9 MMOL/L — SIGNIFICANT CHANGE UP (ref 5–17)
APTT BLD: 29.9 SEC — SIGNIFICANT CHANGE UP (ref 27.5–35.5)
AST SERPL-CCNC: 23 U/L — SIGNIFICANT CHANGE UP
BASOPHILS # BLD AUTO: 0.02 K/UL — SIGNIFICANT CHANGE UP (ref 0–0.2)
BASOPHILS NFR BLD AUTO: 0.3 % — SIGNIFICANT CHANGE UP (ref 0–2)
BILIRUB SERPL-MCNC: 0.2 MG/DL — LOW (ref 0.4–2)
BLD GP AB SCN SERPL QL: SIGNIFICANT CHANGE UP
BUN SERPL-MCNC: 19.4 MG/DL — SIGNIFICANT CHANGE UP (ref 8–20)
CALCIUM SERPL-MCNC: 9.2 MG/DL — SIGNIFICANT CHANGE UP (ref 8.4–10.5)
CHLORIDE SERPL-SCNC: 109 MMOL/L — HIGH (ref 96–108)
CO2 SERPL-SCNC: 27 MMOL/L — SIGNIFICANT CHANGE UP (ref 22–29)
CREAT SERPL-MCNC: 0.66 MG/DL — SIGNIFICANT CHANGE UP (ref 0.5–1.3)
EGFR: 97 ML/MIN/1.73M2 — SIGNIFICANT CHANGE UP
EOSINOPHIL # BLD AUTO: 0.11 K/UL — SIGNIFICANT CHANGE UP (ref 0–0.5)
EOSINOPHIL NFR BLD AUTO: 1.7 % — SIGNIFICANT CHANGE UP (ref 0–6)
GLUCOSE SERPL-MCNC: 84 MG/DL — SIGNIFICANT CHANGE UP (ref 70–99)
HCT VFR BLD CALC: 38.3 % — SIGNIFICANT CHANGE UP (ref 34.5–45)
HGB BLD-MCNC: 12.5 G/DL — SIGNIFICANT CHANGE UP (ref 11.5–15.5)
IMM GRANULOCYTES NFR BLD AUTO: 0.3 % — SIGNIFICANT CHANGE UP (ref 0–0.9)
INR BLD: 1 RATIO — SIGNIFICANT CHANGE UP (ref 0.88–1.16)
LYMPHOCYTES # BLD AUTO: 1.71 K/UL — SIGNIFICANT CHANGE UP (ref 1–3.3)
LYMPHOCYTES # BLD AUTO: 26.6 % — SIGNIFICANT CHANGE UP (ref 13–44)
MCHC RBC-ENTMCNC: 30.6 PG — SIGNIFICANT CHANGE UP (ref 27–34)
MCHC RBC-ENTMCNC: 32.6 GM/DL — SIGNIFICANT CHANGE UP (ref 32–36)
MCV RBC AUTO: 93.6 FL — SIGNIFICANT CHANGE UP (ref 80–100)
MONOCYTES # BLD AUTO: 0.49 K/UL — SIGNIFICANT CHANGE UP (ref 0–0.9)
MONOCYTES NFR BLD AUTO: 7.6 % — SIGNIFICANT CHANGE UP (ref 2–14)
MRSA PCR RESULT.: SIGNIFICANT CHANGE UP
NEUTROPHILS # BLD AUTO: 4.08 K/UL — SIGNIFICANT CHANGE UP (ref 1.8–7.4)
NEUTROPHILS NFR BLD AUTO: 63.5 % — SIGNIFICANT CHANGE UP (ref 43–77)
PLATELET # BLD AUTO: 228 K/UL — SIGNIFICANT CHANGE UP (ref 150–400)
POTASSIUM SERPL-MCNC: 5 MMOL/L — SIGNIFICANT CHANGE UP (ref 3.5–5.3)
POTASSIUM SERPL-SCNC: 5 MMOL/L — SIGNIFICANT CHANGE UP (ref 3.5–5.3)
PROT SERPL-MCNC: 6.9 G/DL — SIGNIFICANT CHANGE UP (ref 6.6–8.7)
PROTHROM AB SERPL-ACNC: 11.6 SEC — SIGNIFICANT CHANGE UP (ref 10.5–13.4)
RBC # BLD: 4.09 M/UL — SIGNIFICANT CHANGE UP (ref 3.8–5.2)
RBC # FLD: 12.6 % — SIGNIFICANT CHANGE UP (ref 10.3–14.5)
S AUREUS DNA NOSE QL NAA+PROBE: SIGNIFICANT CHANGE UP
SARS-COV-2 RNA SPEC QL NAA+PROBE: SIGNIFICANT CHANGE UP
SODIUM SERPL-SCNC: 145 MMOL/L — SIGNIFICANT CHANGE UP (ref 135–145)
WBC # BLD: 6.43 K/UL — SIGNIFICANT CHANGE UP (ref 3.8–10.5)
WBC # FLD AUTO: 6.43 K/UL — SIGNIFICANT CHANGE UP (ref 3.8–10.5)

## 2022-11-25 RX ORDER — CIPROFLOXACIN LACTATE 400MG/40ML
0 VIAL (ML) INTRAVENOUS
Qty: 0 | Refills: 0 | DISCHARGE

## 2022-11-25 RX ORDER — ATENOLOL 25 MG/1
0 TABLET ORAL
Qty: 0 | Refills: 1 | DISCHARGE

## 2022-11-25 NOTE — H&P PST ADULT - EKG AND INTERPRETATION
EKG dated 9/16/2022: Dated Normal sinus rhythm 62 bpm, no acute change. EKG pending official reading

## 2022-11-25 NOTE — H&P PST ADULT - CARDIOVASCULAR
normal/regular rate and rhythm/S1 S2 present/no gallops/no rub/no murmur negative normal/regular rate and rhythm/S1 S2 present/no gallops/no rub/no murmur/murmur

## 2022-11-25 NOTE — H&P PST ADULT - PROBLEM SELECTOR PLAN 2
Cerebral angiogram with embolization Follow-up with cardiologist/ PCP for medical clearance prior to this procedure

## 2022-11-25 NOTE — H&P PST ADULT - ASSESSMENT
Pt is a 67 years old female, right hand dominant, seen today pre-op  for Cerebral angiogram with embolization. Pt s/p Cerebral angiogram on 2022, results revealing left ophthalmic artery aneurysm 4.1 x3.4mm, right ophthalmic artery aneurysm  3.2 mm x2.9mm, 2 small R ICA cavernous aneurysm 2.4mm and 1.9mm. Pt medical hx includes HTN, HLD, OA, Osteoporosis, chronic back pain, Thyroid nodules, cerebral aneurysm and headaches. Pt report this was  incidental right ICA aneurysm findings on MRI/A due to longstanding occasional  headaches which occurs for 1-2 weeks at times, subsequently her neurologist Dr. Thom Case  ordered MRI/A which revealed a 3-4mm right ICA aneurysm. Pt states intermittent headaches persist till date.  Pt denies syncope/ pre-syncope, no change visual disturbance, no nausea/emesis, no Fever/chills. Seen  today for a scheduled procedure on 2022. Surgery protocol reviewed with Pt today, Pt states she was already seen by her cardiologist and PCP for clearances  CAPRINI VTE 2.0 SCORE [CLOT updated 2019]    AGE RELATED RISK FACTORS                                                       MOBILITY RELATED FACTORS  [ ] Age 41-60 years                                            (1 Point)                    [ ] Bed rest                                                        (1 Point)  [x ] Age: 61-74 years                                           (2 Points)                  [ ] Plaster cast                                                   (2 Points)  [ ] Age= 75 years                                              (3 Points)                    [ ] Bed bound for more than 72 hours                 (2 Points)    DISEASE RELATED RISK FACTORS                                               GENDER SPECIFIC FACTORS  [ ] Edema in the lower extremities                       (1 Point)              [ ] Pregnancy                                                     (1 Point)  [ ] Varicose veins                                               (1 Point)                     [ ] Post-partum < 6 weeks                                   (1 Point)             [x ] BMI > 25 Kg/m2                                            (1 Point)                     [ ] Hormonal therapy  or oral contraception          (1 Point)                 [ ] Sepsis (in the previous month)                        (1 Point)               [ ] History of pregnancy complications                 (1 point)  [ ] Pneumonia or serious lung disease                                               [ ] Unexplained or recurrent                     (1 Point)           (in the previous month)                               (1 Point)  [ ] Abnormal pulmonary function test                     (1 Point)                 SURGERY RELATED RISK FACTORS  [ ] Acute myocardial infarction                              (1 Point)               [ ]  Section                                             (1 Point)  [ ] Congestive heart failure (in the previous month)  (1 Point)      [ ] Minor surgery                                                  (1 Point)   [ ] Inflammatory bowel disease                             (1 Point)               [ ] Arthroscopic surgery                                        (2 Points)  [ ] Central venous access                                      (2 Points)                [ x] General surgery lasting more than 45 minutes (2 points)  [ ] Malignancy- Present or previous                   (2 Points)                [ ] Elective arthroplasty                                         (5 points)    [ ] Stroke (in the previous month)                          (5 Points)                                                                                                                                                           HEMATOLOGY RELATED FACTORS                                                 TRAUMA RELATED RISK FACTORS  [ ] Prior episodes of VTE                                     (3 Points)                [ ] Fracture of the hip, pelvis, or leg                       (5 Points)  [ ] Positive family history for VTE                         (3 Points)             [ ] Acute spinal cord injury (in the previous month)  (5 Points)  [ ] Prothrombin 03537 A                                     (3 Points)               [ ] Paralysis  (less than 1 month)                             (5 Points)  [ ] Factor V Leiden                                             (3 Points)                  [ ] Multiple Trauma within 1 month                        (5 Points)  [ ] Lupus anticoagulants                                     (3 Points)                                                           [ ] Anticardiolipin antibodies                               (3 Points)                                                       [ ] High homocysteine in the blood                      (3 Points)                                             [ ] Other congenital or acquired thrombophilia      (3 Points)                                                [ ] Heparin induced thrombocytopenia                  (3 Points)                                     Total Score [     5     ]   OPIOID RISK TOOL    KANCHAN EACH BOX THAT APPLIES AND ADD TOTALS AT THE END    FAMILY HISTORY OF SUBSTANCE ABUSE                 FEMALE         MALE                                                Alcohol                             [  ]1 pt          [  ]3pts                                               Illegal Durgs                     [  ]2 pts        [  ]3pts                                               Rx Drugs                           [  ]4 pts        [  ]4 pts    PERSONAL HISTORY OF SUBSTANCE ABUSE                                                                                          Alcohol                             [  ]3 pts       [  ]3 pts                                               Illegal Drugs                     [  ]4 pts        [  ]4 pts                                               Rx Drugs                           [  ]5 pts        [  ]5 pts    AGE BETWEEN 16-45 YEARS                                      [  ]1 pt         [  ]1 pt    HISTORY OF PREADOLESCENT   SEXUAL ABUSE                                                             [  ]3 pts        [  ]0pts    PSYCHOLOGICAL DISEASE                     ADD, OCD, Bipolar, Schizophrenia        [  ]2 pts         [  ]2 pts                      Depression                                               [  ]1 pt           [  ]1 pt           SCORING TOTAL   (add numbers and type here)              (**0*)                                     A score of 3 or lower indicated LOW risk for future opioid abuse  A score of 4 to 7 indicated moderate risk for future opioid abuse  A score of 8 or higher indicates a high risk for opioid abuse

## 2022-11-25 NOTE — H&P PST ADULT - HISTORY OF PRESENT ILLNESS
Pt is a 67 years old female, right hand dominant, seen today pre-op  for Cerebral angiogram. Pt medical hx includes OA, Osteoporosis, chronic back pain, Thyroid nodules, cerebral aneurysm and headaches. Pt report incidental right ICA aneurysm found on MRI/A. Pt report occasional headaches which occurs for 1-2 weeks at times,  subsequently her neurologist Dr. Thom Case  ordered MRI/A which revealed a 3-4mm right ICA aneurysm. Pt states her last episode of headaches was  this morning at about 0400. Pt denies further symptoms of headaches at this time, denies syncope/ pre-syncope, no change visual disturbance, no nausea/emesis, no Fever/chills. Pt seen today for a scheduled procedure on 9/2022 with Dr. Bib Villalobos.   Pt is a 67 years old female, right hand dominant, seen today pre-op  for Cerebral angiogram with embolization. 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 Pt medical hx includes HTN, HLD, OA, Osteoporosis, chronic back pain, Thyroid nodules, cerebral aneurysm and headaches. Pt report this was  incidental right ICA aneurysm findings on MRI/A due to longstanding occasional  headaches which occurs for 1-2 weeks at times, subsequently her neurologist Dr. Thom Case  ordered MRI/A which revealed a 3-4mm right ICA aneurysm. Pt states intermittent headaches persist till date.  Pt denies syncope/ pre-syncope, no change visual disturbance, no nausea/emesis, no Fever/chills. Seen  today for a scheduled procedure on 11/29/2022.

## 2022-11-25 NOTE — H&P PST ADULT - NSICDXPASTMEDICALHX_GEN_ALL_CORE_FT
PAST MEDICAL HISTORY:  Back pain     History of cerebral aneurysm     Hypertension     Lumbar herniated disc     Murmur, heart     Osteoporosis     Thyroid nodule      PAST MEDICAL HISTORY:  Back pain     Glaucoma     History of cerebral aneurysm     HLD (hyperlipidemia)     Hypertension     Lumbar herniated disc     Murmur, heart     Osteoporosis     Thyroid nodule

## 2022-11-29 ENCOUNTER — APPOINTMENT (OUTPATIENT)
Dept: NEUROSURGERY | Facility: HOSPITAL | Age: 67
End: 2022-11-29

## 2022-11-29 ENCOUNTER — INPATIENT (INPATIENT)
Facility: HOSPITAL | Age: 67
LOS: 0 days | Discharge: HOME | DRG: 272 | End: 2022-11-30
Attending: NEUROLOGICAL SURGERY | Admitting: NEUROLOGICAL SURGERY
Payer: COMMERCIAL

## 2022-11-29 VITALS
DIASTOLIC BLOOD PRESSURE: 73 MMHG | WEIGHT: 138.89 LBS | TEMPERATURE: 98 F | OXYGEN SATURATION: 98 % | HEART RATE: 67 BPM | RESPIRATION RATE: 16 BRPM | HEIGHT: 64 IN | SYSTOLIC BLOOD PRESSURE: 149 MMHG

## 2022-11-29 DIAGNOSIS — I67.1 CEREBRAL ANEURYSM, NONRUPTURED: ICD-10-CM

## 2022-11-29 DIAGNOSIS — Z98.890 OTHER SPECIFIED POSTPROCEDURAL STATES: Chronic | ICD-10-CM

## 2022-11-29 DIAGNOSIS — Z90.89 ACQUIRED ABSENCE OF OTHER ORGANS: Chronic | ICD-10-CM

## 2022-11-29 LAB
ANION GAP SERPL CALC-SCNC: 14 MMOL/L — SIGNIFICANT CHANGE UP (ref 5–17)
BASOPHILS # BLD AUTO: 0.01 K/UL — SIGNIFICANT CHANGE UP (ref 0–0.2)
BASOPHILS NFR BLD AUTO: 0.2 % — SIGNIFICANT CHANGE UP (ref 0–2)
BUN SERPL-MCNC: 12.6 MG/DL — SIGNIFICANT CHANGE UP (ref 8–20)
CALCIUM SERPL-MCNC: 8 MG/DL — LOW (ref 8.4–10.5)
CHLORIDE SERPL-SCNC: 110 MMOL/L — HIGH (ref 96–108)
CO2 SERPL-SCNC: 20 MMOL/L — LOW (ref 22–29)
CREAT SERPL-MCNC: 0.56 MG/DL — SIGNIFICANT CHANGE UP (ref 0.5–1.3)
EGFR: 101 ML/MIN/1.73M2 — SIGNIFICANT CHANGE UP
EOSINOPHIL # BLD AUTO: 0 K/UL — SIGNIFICANT CHANGE UP (ref 0–0.5)
EOSINOPHIL NFR BLD AUTO: 0 % — SIGNIFICANT CHANGE UP (ref 0–6)
GLUCOSE SERPL-MCNC: 127 MG/DL — HIGH (ref 70–99)
HCT VFR BLD CALC: 35.2 % — SIGNIFICANT CHANGE UP (ref 34.5–45)
HGB BLD-MCNC: 11.5 G/DL — SIGNIFICANT CHANGE UP (ref 11.5–15.5)
IMM GRANULOCYTES NFR BLD AUTO: 0.2 % — SIGNIFICANT CHANGE UP (ref 0–0.9)
LYMPHOCYTES # BLD AUTO: 0.62 K/UL — LOW (ref 1–3.3)
LYMPHOCYTES # BLD AUTO: 12 % — LOW (ref 13–44)
MAGNESIUM SERPL-MCNC: 1.8 MG/DL — SIGNIFICANT CHANGE UP (ref 1.6–2.6)
MCHC RBC-ENTMCNC: 30.4 PG — SIGNIFICANT CHANGE UP (ref 27–34)
MCHC RBC-ENTMCNC: 32.7 GM/DL — SIGNIFICANT CHANGE UP (ref 32–36)
MCV RBC AUTO: 93.1 FL — SIGNIFICANT CHANGE UP (ref 80–100)
MONOCYTES # BLD AUTO: 0.07 K/UL — SIGNIFICANT CHANGE UP (ref 0–0.9)
MONOCYTES NFR BLD AUTO: 1.4 % — LOW (ref 2–14)
NEUTROPHILS # BLD AUTO: 4.45 K/UL — SIGNIFICANT CHANGE UP (ref 1.8–7.4)
NEUTROPHILS NFR BLD AUTO: 86.2 % — HIGH (ref 43–77)
PA ADP PRP-ACNC: 70 PRU — LOW (ref 180–376)
PHOSPHATE SERPL-MCNC: 4.3 MG/DL — SIGNIFICANT CHANGE UP (ref 2.4–4.7)
PLATELET # BLD AUTO: 216 K/UL — SIGNIFICANT CHANGE UP (ref 150–400)
POTASSIUM SERPL-MCNC: 3.7 MMOL/L — SIGNIFICANT CHANGE UP (ref 3.5–5.3)
POTASSIUM SERPL-SCNC: 3.7 MMOL/L — SIGNIFICANT CHANGE UP (ref 3.5–5.3)
RBC # BLD: 3.78 M/UL — LOW (ref 3.8–5.2)
RBC # FLD: 12.8 % — SIGNIFICANT CHANGE UP (ref 10.3–14.5)
SODIUM SERPL-SCNC: 144 MMOL/L — SIGNIFICANT CHANGE UP (ref 135–145)
WBC # BLD: 5.16 K/UL — SIGNIFICANT CHANGE UP (ref 3.8–10.5)
WBC # FLD AUTO: 5.16 K/UL — SIGNIFICANT CHANGE UP (ref 3.8–10.5)

## 2022-11-29 PROCEDURE — 61624 TCAT PERM OCCLS/EMBOLJ CNS: CPT

## 2022-11-29 PROCEDURE — 36227 PLACE CATH XTRNL CAROTID: CPT | Mod: LT

## 2022-11-29 PROCEDURE — 99291 CRITICAL CARE FIRST HOUR: CPT

## 2022-11-29 PROCEDURE — 36228 PLACE CATH INTRACRANIAL ART: CPT | Mod: LT

## 2022-11-29 PROCEDURE — 70496 CT ANGIOGRAPHY HEAD: CPT | Mod: 26

## 2022-11-29 PROCEDURE — 36224 PLACE CATH CAROTD ART: CPT | Mod: LT

## 2022-11-29 PROCEDURE — 75898 FOLLOW-UP ANGIOGRAPHY: CPT | Mod: 26

## 2022-11-29 PROCEDURE — 36223 PLACE CATH CAROTID/INOM ART: CPT | Mod: 59,RT

## 2022-11-29 PROCEDURE — 75894 X-RAYS TRANSCATH THERAPY: CPT | Mod: 26

## 2022-11-29 RX ORDER — MAGNESIUM SULFATE 500 MG/ML
2 VIAL (ML) INJECTION ONCE
Refills: 0 | Status: COMPLETED | OUTPATIENT
Start: 2022-11-29 | End: 2022-11-29

## 2022-11-29 RX ORDER — ASCORBIC ACID 60 MG
0 TABLET,CHEWABLE ORAL
Qty: 0 | Refills: 0 | DISCHARGE

## 2022-11-29 RX ORDER — ATORVASTATIN CALCIUM 80 MG/1
40 TABLET, FILM COATED ORAL AT BEDTIME
Refills: 0 | Status: DISCONTINUED | OUTPATIENT
Start: 2022-11-29 | End: 2022-11-30

## 2022-11-29 RX ORDER — ASPIRIN/CALCIUM CARB/MAGNESIUM 324 MG
81 TABLET ORAL DAILY
Refills: 0 | Status: DISCONTINUED | OUTPATIENT
Start: 2022-11-30 | End: 2022-11-30

## 2022-11-29 RX ORDER — ACETAMINOPHEN 500 MG
975 TABLET ORAL EVERY 6 HOURS
Refills: 0 | Status: DISCONTINUED | OUTPATIENT
Start: 2022-11-29 | End: 2022-11-30

## 2022-11-29 RX ORDER — SODIUM CHLORIDE 9 MG/ML
1000 INJECTION INTRAMUSCULAR; INTRAVENOUS; SUBCUTANEOUS
Refills: 0 | Status: DISCONTINUED | OUTPATIENT
Start: 2022-11-29 | End: 2022-11-30

## 2022-11-29 RX ORDER — ATENOLOL 25 MG/1
25 TABLET ORAL DAILY
Refills: 0 | Status: DISCONTINUED | OUTPATIENT
Start: 2022-11-29 | End: 2022-11-30

## 2022-11-29 RX ORDER — TICAGRELOR 90 MG/1
90 TABLET ORAL EVERY 12 HOURS
Refills: 0 | Status: DISCONTINUED | OUTPATIENT
Start: 2022-11-29 | End: 2022-11-30

## 2022-11-29 RX ORDER — OXYCODONE HYDROCHLORIDE 5 MG/1
5 TABLET ORAL ONCE
Refills: 0 | Status: DISCONTINUED | OUTPATIENT
Start: 2022-11-29 | End: 2022-11-29

## 2022-11-29 RX ORDER — ERGOCALCIFEROL 1.25 MG/1
0 CAPSULE ORAL
Qty: 0 | Refills: 0 | DISCHARGE

## 2022-11-29 RX ORDER — ACETAMINOPHEN 500 MG
1000 TABLET ORAL ONCE
Refills: 0 | Status: COMPLETED | OUTPATIENT
Start: 2022-11-29 | End: 2022-11-29

## 2022-11-29 RX ORDER — PANTOPRAZOLE SODIUM 20 MG/1
40 TABLET, DELAYED RELEASE ORAL DAILY
Refills: 0 | Status: DISCONTINUED | OUTPATIENT
Start: 2022-11-29 | End: 2022-11-30

## 2022-11-29 RX ADMIN — SODIUM CHLORIDE 75 MILLILITER(S): 9 INJECTION INTRAMUSCULAR; INTRAVENOUS; SUBCUTANEOUS at 13:51

## 2022-11-29 RX ADMIN — Medication 25 GRAM(S): at 17:44

## 2022-11-29 RX ADMIN — Medication 1000 MILLIGRAM(S): at 17:15

## 2022-11-29 RX ADMIN — TICAGRELOR 90 MILLIGRAM(S): 90 TABLET ORAL at 19:14

## 2022-11-29 RX ADMIN — OXYCODONE HYDROCHLORIDE 5 MILLIGRAM(S): 5 TABLET ORAL at 22:06

## 2022-11-29 RX ADMIN — OXYCODONE HYDROCHLORIDE 5 MILLIGRAM(S): 5 TABLET ORAL at 21:36

## 2022-11-29 RX ADMIN — ATORVASTATIN CALCIUM 40 MILLIGRAM(S): 80 TABLET, FILM COATED ORAL at 21:36

## 2022-11-29 RX ADMIN — Medication 400 MILLIGRAM(S): at 17:01

## 2022-11-29 NOTE — CHART NOTE - NSCHARTNOTEFT_GEN_A_CORE
Interventional Neuro- Radiology   Procedure Note     Procedure:Selective Cerebral Angiography   Pre- Procedure Diagnosis:  Post- Procedure Diagnosis:    : Dr. Mccartney    First Assist: Dr. Rahman     RN:    Anesthesia:      Sheath:    I/Os/Meds/Vitals: see nursing/anesthesia report     Preliminary Report:  Under  general anesthesia, using a ___Fr short/long sheath to the right/ left/ bilateral groin examination of left vertebral artery/ left common carotid artery/ left external carotid artey/ right vertebral artery/ right common carotid artery/ right external carotid artery via selective cerebral angiography demonstrates ________. ( Official note to follow).    Patient tolerated procedure well, vital signs as noted above,  no change in neurological status noted.  Results discussed with   Groin sheath d/c'ed  , manual compression held to hemostasis, no active bleeding, no hematoma, soft throughout, + pedal pulses, angio-seal device applied, quick clot and safeguard balloon dressing applied       Patient transfered to  ICU. Interventional Neuro- Radiology   Procedure Note     Procedure:Selective Cerebral Angiography   Pre- Procedure Diagnosis: Bilateral ICA aneurysms  Post- Procedure Diagnosis: Same.    : Dr. Mccartney    First Assist: Dr. 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 A total of 5 PED were deployed. ( Official note to follow).    Patient tolerated procedure well, vital signs as noted above,  no change in neurological status noted.  Results discussed with   Groin sheath d/c'ed  and manual compression held to hemostasis, no active bleeding, no hematoma, soft throughout, + pedal pulses, Celt closure  device applied, quick clot and safeguard balloon dressing applied       Patient transfered to  ICU.

## 2022-11-29 NOTE — CHART NOTE - NSCHARTNOTEFT_GEN_A_CORE
Interventional Neuro Radiology  Pre-Procedure Note     HPI:Pt is a 67 years old women right hand dominant, Pt medical hx includes HTN, HLD, OA, Osteoporosis, chronic back pain, Thyroid nodules, cerebral aneurysm and headaches. Noted this was  incidental right ICA aneurysm findings on MRI/A due to longstanding occasional  headaches. She was seen by Dr. Mccartney and underwent cerebral angiogram   on September 20, 2022.  There was fibromuscular dysplasia in the carotid artery in the neck, right greater than left. There are 2 ophthalmic aneurysms. The left side is a double-domed aneurysm that is slightly bigger than the right side, and there are 2 small cavernous aneurysms on the right ICA as well measuring 2.4 and 1.9 mm. Intermittent headaches persist. Recommended to Neuro IR for embolization.      Neuro Exam: Awake and alert, oriented x3, fluent, normal naming and repetition, follows 3 step commands. Extraocular movements intact, no nystagmus, visual fields full, face symmetric, tongue midline. No drift, 5/5 power x 4 extremities. Normal sensation to LT. Normal finger-to-nose and rapid alternating movements.    PAST MEDICAL & SURGICAL HISTORY:  Lumbar herniated disc  Osteoporosis  Back pain  History of cerebral aneurysm  Thyroid nodule  Hypertension  Murmur, heart  HLD (hyperlipidemia)  Glaucoma  H/O coronary angiogram  History of tonsillectomy    Social History: denies nicotine, rare etoh    FAMILY HISTORY:  FH: arterial aneurysm (Mother, Aunt)    Allergies: No Known Allergies    Current Medications: see H/P    Labs:     see sunrise     Blood Bank: see sunrise     Assessment/Plan:   This is a 68y/o women with Severe fibromuscular dysplasia involving the  bilateral cervical internal carotid and vertebral arteries, bilateral   ophthalmic artery aneurysms, right cavernous carotid aneurysms.  Patient presents to neuro-IR for selective cerebral angiography/aneurysm embolization.  Procedure/ risks/ benefits/ goals/ alternatives were explained. Risks include but are not limited to stroke/ vessel injury/ hemorrhage/ groin hematoma. All questions answered and concerns addressed. Informed content obtained from  ____ who verbalizes /expresses full understanding. Consent placed in chart. Interventional Neuro Radiology  Pre-Procedure Note     HPI:Pt is a 67 years old women right hand dominant, Pt medical hx includes HTN, HLD, OA, Osteoporosis, chronic back pain, Thyroid nodules, cerebral aneurysm and headaches. Noted this was  incidental right ICA aneurysm findings on MRI/A due to longstanding occasional  headaches. She was seen by Dr. Mccartney and underwent cerebral angiogram   on September 20, 2022.  There was fibromuscular dysplasia in the carotid artery in the neck, right greater than left. There are 2 ophthalmic aneurysms. The left side is a double-domed aneurysm that is slightly bigger than the right side, and there are 2 small cavernous aneurysms on the right ICA as well measuring 2.4 and 1.9 mm. Intermittent headaches persist. Recommended to Neuro IR for embolization.      Neuro Exam: Awake and alert, oriented x3, fluent, normal naming and repetition, follows 3 step commands. Extraocular movements intact, no nystagmus, visual fields full, face symmetric, tongue midline. No drift, 5/5 power x 4 extremities. Normal sensation to LT. Normal finger-to-nose and rapid alternating movements.    PAST MEDICAL & SURGICAL HISTORY:  Lumbar herniated disc  Osteoporosis  Back pain  History of cerebral aneurysm  Thyroid nodule  Hypertension  Murmur, heart  HLD (hyperlipidemia)  Glaucoma  H/O coronary angiogram  History of tonsillectomy    Social History: denies nicotine, rare etoh    FAMILY HISTORY:  FH: arterial aneurysm (Mother, Aunt)    Allergies: No Known Allergies    Current Medications: see H/P    Labs:     see sunrise     Blood Bank: see sunrise     Assessment/Plan:   This is a 66y/o women with Severe fibromuscular dysplasia involving the  bilateral cervical internal carotid and vertebral arteries, bilateral   ophthalmic artery aneurysms, right cavernous carotid aneurysms.  Patient presents to neuro-IR for selective cerebral angiography/aneurysm embolization.  Procedure/ risks/ benefits/ goals/ alternatives were explained. Risks include but are not limited to stroke/ vessel injury/ hemorrhage/ groin hematoma. All questions answered and concerns addressed. Informed content obtained from  patient who verbalizes /expresses full understanding. Consent placed in chart.  Patient was seen and examined by me. History and exam as documented above by PA/NP was confirmed by me.  Agree with plan as outlined above.

## 2022-11-30 ENCOUNTER — TRANSCRIPTION ENCOUNTER (OUTPATIENT)
Age: 67
End: 2022-11-30

## 2022-11-30 VITALS
OXYGEN SATURATION: 100 % | HEART RATE: 78 BPM | RESPIRATION RATE: 16 BRPM | SYSTOLIC BLOOD PRESSURE: 133 MMHG | DIASTOLIC BLOOD PRESSURE: 90 MMHG

## 2022-11-30 LAB
ANION GAP SERPL CALC-SCNC: 6 MMOL/L — SIGNIFICANT CHANGE UP (ref 5–17)
BASOPHILS # BLD AUTO: 0.01 K/UL — SIGNIFICANT CHANGE UP (ref 0–0.2)
BASOPHILS NFR BLD AUTO: 0.2 % — SIGNIFICANT CHANGE UP (ref 0–2)
BUN SERPL-MCNC: 12.5 MG/DL — SIGNIFICANT CHANGE UP (ref 8–20)
CALCIUM SERPL-MCNC: 8 MG/DL — LOW (ref 8.4–10.5)
CHLORIDE SERPL-SCNC: 108 MMOL/L — SIGNIFICANT CHANGE UP (ref 96–108)
CO2 SERPL-SCNC: 24 MMOL/L — SIGNIFICANT CHANGE UP (ref 22–29)
CREAT SERPL-MCNC: 0.55 MG/DL — SIGNIFICANT CHANGE UP (ref 0.5–1.3)
EGFR: 102 ML/MIN/1.73M2 — SIGNIFICANT CHANGE UP
EOSINOPHIL # BLD AUTO: 0.02 K/UL — SIGNIFICANT CHANGE UP (ref 0–0.5)
EOSINOPHIL NFR BLD AUTO: 0.3 % — SIGNIFICANT CHANGE UP (ref 0–6)
GLUCOSE SERPL-MCNC: 110 MG/DL — HIGH (ref 70–99)
HCT VFR BLD CALC: 33.1 % — LOW (ref 34.5–45)
HGB BLD-MCNC: 10.6 G/DL — LOW (ref 11.5–15.5)
IMM GRANULOCYTES NFR BLD AUTO: 0.3 % — SIGNIFICANT CHANGE UP (ref 0–0.9)
LYMPHOCYTES # BLD AUTO: 1.41 K/UL — SIGNIFICANT CHANGE UP (ref 1–3.3)
LYMPHOCYTES # BLD AUTO: 22.6 % — SIGNIFICANT CHANGE UP (ref 13–44)
MAGNESIUM SERPL-MCNC: 2.4 MG/DL — SIGNIFICANT CHANGE UP (ref 1.6–2.6)
MCHC RBC-ENTMCNC: 30 PG — SIGNIFICANT CHANGE UP (ref 27–34)
MCHC RBC-ENTMCNC: 32 GM/DL — SIGNIFICANT CHANGE UP (ref 32–36)
MCV RBC AUTO: 93.8 FL — SIGNIFICANT CHANGE UP (ref 80–100)
MONOCYTES # BLD AUTO: 0.56 K/UL — SIGNIFICANT CHANGE UP (ref 0–0.9)
MONOCYTES NFR BLD AUTO: 9 % — SIGNIFICANT CHANGE UP (ref 2–14)
NEUTROPHILS # BLD AUTO: 4.22 K/UL — SIGNIFICANT CHANGE UP (ref 1.8–7.4)
NEUTROPHILS NFR BLD AUTO: 67.6 % — SIGNIFICANT CHANGE UP (ref 43–77)
PA ADP PRP-ACNC: 125 PRU — LOW (ref 180–376)
PHOSPHATE SERPL-MCNC: 2.8 MG/DL — SIGNIFICANT CHANGE UP (ref 2.4–4.7)
PLATELET # BLD AUTO: 207 K/UL — SIGNIFICANT CHANGE UP (ref 150–400)
POTASSIUM SERPL-MCNC: 4.1 MMOL/L — SIGNIFICANT CHANGE UP (ref 3.5–5.3)
POTASSIUM SERPL-SCNC: 4.1 MMOL/L — SIGNIFICANT CHANGE UP (ref 3.5–5.3)
RBC # BLD: 3.53 M/UL — LOW (ref 3.8–5.2)
RBC # FLD: 12.9 % — SIGNIFICANT CHANGE UP (ref 10.3–14.5)
SODIUM SERPL-SCNC: 138 MMOL/L — SIGNIFICANT CHANGE UP (ref 135–145)
WBC # BLD: 6.24 K/UL — SIGNIFICANT CHANGE UP (ref 3.8–10.5)
WBC # FLD AUTO: 6.24 K/UL — SIGNIFICANT CHANGE UP (ref 3.8–10.5)

## 2022-11-30 PROCEDURE — C1760: CPT

## 2022-11-30 PROCEDURE — 99233 SBSQ HOSP IP/OBS HIGH 50: CPT

## 2022-11-30 PROCEDURE — 70548 MR ANGIOGRAPHY NECK W/DYE: CPT | Mod: 26

## 2022-11-30 PROCEDURE — 70544 MR ANGIOGRAPHY HEAD W/O DYE: CPT

## 2022-11-30 PROCEDURE — 61624 TCAT PERM OCCLS/EMBOLJ CNS: CPT

## 2022-11-30 PROCEDURE — C1887: CPT

## 2022-11-30 PROCEDURE — C1894: CPT

## 2022-11-30 PROCEDURE — 70544 MR ANGIOGRAPHY HEAD W/O DYE: CPT | Mod: 26

## 2022-11-30 PROCEDURE — 36227 PLACE CATH XTRNL CAROTID: CPT

## 2022-11-30 PROCEDURE — 85025 COMPLETE CBC W/AUTO DIFF WBC: CPT

## 2022-11-30 PROCEDURE — 36228 PLACE CATH INTRACRANIAL ART: CPT

## 2022-11-30 PROCEDURE — 84100 ASSAY OF PHOSPHORUS: CPT

## 2022-11-30 PROCEDURE — 83735 ASSAY OF MAGNESIUM: CPT

## 2022-11-30 PROCEDURE — 80048 BASIC METABOLIC PNL TOTAL CA: CPT

## 2022-11-30 PROCEDURE — 70548 MR ANGIOGRAPHY NECK W/DYE: CPT

## 2022-11-30 PROCEDURE — 75894 X-RAYS TRANSCATH THERAPY: CPT

## 2022-11-30 PROCEDURE — 36223 PLACE CATH CAROTID/INOM ART: CPT

## 2022-11-30 PROCEDURE — C1889: CPT

## 2022-11-30 PROCEDURE — 36415 COLL VENOUS BLD VENIPUNCTURE: CPT

## 2022-11-30 PROCEDURE — C1769: CPT

## 2022-11-30 PROCEDURE — 75898 FOLLOW-UP ANGIOGRAPHY: CPT

## 2022-11-30 PROCEDURE — 85576 BLOOD PLATELET AGGREGATION: CPT

## 2022-11-30 PROCEDURE — 36224 PLACE CATH CAROTD ART: CPT

## 2022-11-30 RX ORDER — ASPIRIN/CALCIUM CARB/MAGNESIUM 324 MG
1 TABLET ORAL
Qty: 0 | Refills: 0 | DISCHARGE

## 2022-11-30 RX ORDER — TICAGRELOR 90 MG/1
1 TABLET ORAL
Qty: 60 | Refills: 0
Start: 2022-11-30 | End: 2022-12-29

## 2022-11-30 RX ORDER — TICAGRELOR 90 MG/1
0 TABLET ORAL
Qty: 0 | Refills: 5 | DISCHARGE

## 2022-11-30 RX ORDER — ASPIRIN/CALCIUM CARB/MAGNESIUM 324 MG
1 TABLET ORAL
Qty: 0 | Refills: 0 | DISCHARGE
Start: 2022-11-30

## 2022-11-30 RX ORDER — ASPIRIN/CALCIUM CARB/MAGNESIUM 324 MG
1 TABLET ORAL
Qty: 30 | Refills: 0
Start: 2022-11-30 | End: 2022-12-29

## 2022-11-30 RX ADMIN — TICAGRELOR 90 MILLIGRAM(S): 90 TABLET ORAL at 18:00

## 2022-11-30 RX ADMIN — TICAGRELOR 90 MILLIGRAM(S): 90 TABLET ORAL at 05:37

## 2022-11-30 RX ADMIN — PANTOPRAZOLE SODIUM 40 MILLIGRAM(S): 20 TABLET, DELAYED RELEASE ORAL at 11:55

## 2022-11-30 RX ADMIN — Medication 975 MILLIGRAM(S): at 10:11

## 2022-11-30 RX ADMIN — Medication 81 MILLIGRAM(S): at 11:55

## 2022-11-30 RX ADMIN — Medication 975 MILLIGRAM(S): at 10:03

## 2022-11-30 RX ADMIN — SODIUM CHLORIDE 75 MILLILITER(S): 9 INJECTION INTRAMUSCULAR; INTRAVENOUS; SUBCUTANEOUS at 10:03

## 2022-11-30 RX ADMIN — SODIUM CHLORIDE 75 MILLILITER(S): 9 INJECTION INTRAMUSCULAR; INTRAVENOUS; SUBCUTANEOUS at 05:37

## 2022-11-30 RX ADMIN — ATENOLOL 25 MILLIGRAM(S): 25 TABLET ORAL at 05:37

## 2022-11-30 NOTE — PROGRESS NOTE ADULT - SUBJECTIVE AND OBJECTIVE BOX
Chief complaint:   Patient is a 67y old  Female who presents with a chief complaint of aneurysm  HPI:  67 years old female, right hand dominant, seen today pre-op  for Cerebral angiogram with embolization. 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 Pt medical hx includes HTN, HLD, OA, Osteoporosis, chronic back pain, Thyroid nodules, cerebral aneurysm and headaches. Pt report this was  incidental right ICA aneurysm findings on MRI/A due to longstanding occasional  headaches which occurs for 1-2 weeks at times, subsequently her neurologist Dr. Thom Case  ordered MRI/A which revealed a 3-4mm right ICA aneurysm. Pt states intermittent headaches persist till date.  Pt denies syncope/ pre-syncope, no change visual disturbance, no nausea/emesis, no Fever/chills. Seen  today for a scheduled procedure on 11/29/2022.       24hr EVENTS:  s/p angio/embo    ROS: no complaints  All other systems negative    -----------------------------------------------------------------------------------------------------------------------------------------------------------------------------------  ICU Vital Signs Last 24 Hrs  T(C): 36.4 (29 Nov 2022 07:16), Max: 36.4 (29 Nov 2022 07:16)  T(F): 97.6 (29 Nov 2022 07:16), Max: 97.6 (29 Nov 2022 07:16)  HR: 80 (29 Nov 2022 12:45) (67 - 80)  BP: 124/78 (29 Nov 2022 12:45) (124/78 - 149/73)  BP(mean): 92 (29 Nov 2022 12:45) (92 - 92)  ABP: 122/65 (29 Nov 2022 12:45) (122/65 - 122/65)  ABP(mean): 88 (29 Nov 2022 12:45) (88 - 88)  RR: 18 (29 Nov 2022 12:45) (16 - 18)  SpO2: 98% (29 Nov 2022 12:45) (98% - 98%)    O2 Parameters below as of 29 Nov 2022 12:45  Patient On (Oxygen Delivery Method): room air            I&O's Summary      MEDICATIONS  (STANDING):  sodium chloride 0.9%. 1000 milliLiter(s) (75 mL/Hr) IV Continuous <Continuous>      RESPIRATORY:        IMAGING:   Recent imaging studies were reviewed.      -----------------------------------------------------------------------------------------------------------------------------------------------------------------------------------    PHYSICAL EXAM:  General: Calm, laying in bed  HEENT: MMM, multidirectional nystagmus  Neuro:  -Mental status- No acute distress, AOx3, conversational, following commands  -CN- PERRL 3mm, EOMI, tongue midline, face symmetric  -Motor- full strength in all ext  -Sensation- intact to LT   -Coordination- no dysmetria noted    CV:   Pulm: Clear to auscultation  Abd: Soft, nontender, nondistended  Ext: No edema  Skin: warm, dry    
  67 years old female, right hand dominant, seen today pre-op  for Cerebral angiogram with embolization. 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 Pt medical hx includes HTN, HLD, OA, Osteoporosis, chronic back pain, Thyroid nodules, cerebral aneurysm and headaches. Pt report this was  incidental right ICA aneurysm findings on MRI/A due to longstanding occasional  headaches which occurs for 1-2 weeks at times, subsequently her neurologist Dr. Thom Case  ordered MRI/A which revealed a 3-4mm right ICA aneurysm. Pt states intermittent headaches persist till date.  Pt denies syncope/ pre-syncope, no change visual disturbance, no nausea/emesis, no Fever/chills. Seen  today for a scheduled procedure on 11/29/2022.       O/n events: none reported.  ROS: no complaints; all systems negative.  VS, labs, imaging reviewed.    PHYSICAL EXAM:  General: Calm, laying in bed  Neuro:  -Mental status- No acute distress, AOx3, conversational, following commands  -CN- PERRL 3mm, EOMI, tongue midline, face symmetric  -Motor- full strength in all ext  -Sensation- intact to LT     CV: S1S2 present  Pulm: Clear to auscultation  Abd: Soft, nontender, nondistended  Ext: No edema  Skin: warm, dry

## 2022-11-30 NOTE — PROGRESS NOTE ADULT - ASSESSMENT
ASSESSMENT/PLAN: 67y old  Female who presents with a chief complaint of aneurysm    NEURO:   post-angio neurochecks, flat for 4hrs  MRI/MRA NOVA   pain mgt: tylenol and oxy 5 prn  ASA and brillinta, check PRU  Activity: [x] mobilize as tolerated [] Bedrest [x] PT [x] OT [] PMNR    PULM: room air   SpO2>92%  incentive spirometry   OOB    CV:  SBP goal     RENAL: monitor I/O  replete lytes prn  kong post-procedure  Fluids: IVF while NPO    GI:  Diet: advance diet as tolerated  GI prophylaxis [x] not indicated   zofran prn for nausea  Bowel regimen [x] senna [] other: miralax    ENDO:   Goal euglycemia (-180)    HEME/ONC:  VTE prophylaxis: [] SCDs [x] chemoprophylaxis      ID: afebrile   no leukocytosis    MISC:    I affirm that this patient is critically ill and at high risk for sudden, fatal deterioration due to one or more of the above stated active issues.     This time does not include bedside procedures that are documented separately.  
ASSESSMENT/PLAN:   68y/o F with severe fibromuscular dysplasia and multiple intracranial aneurysms (bilateral ophthalmic artery aneurysms, right cavernous carotid aneurysms).    S/p selective cerebral angiography/aneurysm embolization with stents placement 11/29. On DAPT now.  Anemia, likely post-procedural, no signs of apparent bleeding; asymptomatic.    Plan:  neurochecks  pending MRI/MRA NOVA   pain mgt: tylenol and oxy 5 prn  ASA and brillinta, cont  maintain SpO2>92%, incentive spirometry; OOB  SBP goal   monitor I/O; replete lytes prn  d/c fluids  advance diet as tolerated; bowel regimen  Goal euglycemia (-180)  VTE prophylaxis: [x] SCDs     possible d/c home after MRI/MRA, pending neuro-IR clearance

## 2022-11-30 NOTE — PATIENT PROFILE ADULT - FALL HARM RISK - UNIVERSAL INTERVENTIONS
Bed in lowest position, wheels locked, appropriate side rails in place/Call bell, personal items and telephone in reach/Instruct patient to call for assistance before getting out of bed or chair/Non-slip footwear when patient is out of bed/[place] to call system/Physically safe environment - no spills, clutter or unnecessary equipment/Purposeful Proactive Rounding/Room/bathroom lighting operational, light cord in reach

## 2022-11-30 NOTE — DISCHARGE NOTE NURSING/CASE MANAGEMENT/SOCIAL WORK - PATIENT PORTAL LINK FT
You can access the FollowMyHealth Patient Portal offered by [facility] by registering at the following website: [email or web address]. By joining [employer]’s FollowMyHealth portal, you will also be able to view your health information using other applications (apps) compatible with our system.

## 2022-11-30 NOTE — DISCHARGE NOTE PROVIDER - NSDCFUADDINST_GEN_ALL_CORE_FT
Patient to please continue to monitor right groin site for any firmness, active bleeding, erythema, ecchymosis, lower back or flank pain, or groin tenderness.   If patient experiences any of the above symptoms she immediately call the neurologist's office and/or go immediately to the nearest emergency.      If you experience any of the following symptoms including but not limited to: chest pain, palpitations, shortness of breath, difficulty breathing, lightheadedness, dizziness, weakness, limb numbness / weakness / paresis, abnormal changes in speech such as slurred speech or difficulty speaking, abnormal changes in vision such as blurry or double vision, incoordination or difficulty with gait and balance please call 911 and go immediately to the nearest emergency rom.

## 2022-11-30 NOTE — DISCHARGE NOTE PROVIDER - NSDCMRMEDTOKEN_GEN_ALL_CORE_FT
aspirin 81 mg oral tablet, chewable: 1 tab(s) orally once a day  ATENOLOL 25 MG TABLET: each orally once a day (at bedtime)  BRILINTA 90 MG TABLET: TAKE 1 TABLET TWICE A DAY  MAGNESIUM OXIDE 400 MG TABLET: TAKE 1 TABLET ORALLY DAILY AS NEEDED  OMEPRAZOLE DR 40 MG CAPSULE: TAKE 1 CAPSULE BY MOUTH EVERY DAY 30 MIN BEFORE MORNING MEAL  ROSUVASTATIN 10MG TAB:   Tylenol: orally prn  UBRELVY 100 MG TABLET: TAKE 1 TABLET BY MOUTH AS ONE DOSE  Visine 0.05% ophthalmic solution:   Vitamin D3: orally once a day   aspirin 81 mg oral tablet, chewable: 1 tab(s) orally once a day  ATENOLOL 25 MG TABLET: each orally once a day (at bedtime)  MAGNESIUM OXIDE 400 MG TABLET: TAKE 1 TABLET ORALLY DAILY AS NEEDED  OMEPRAZOLE DR 40 MG CAPSULE: TAKE 1 CAPSULE BY MOUTH EVERY DAY 30 MIN BEFORE MORNING MEAL  ROSUVASTATIN 10MG TAB:   Tylenol: orally prn  UBRELVY 100 MG TABLET: TAKE 1 TABLET BY MOUTH AS ONE DOSE  Visine 0.05% ophthalmic solution:   Vitamin D3: orally once a day   aspirin 81 mg oral delayed release tablet: 1 tab(s) orally once a day  ATENOLOL 25 MG TABLET: each orally once a day (at bedtime)  BRILINTA 90 MG TABLET: 1 tab(s) orally 2 times a day MDD:2  MAGNESIUM OXIDE 400 MG TABLET: TAKE 1 TABLET ORALLY DAILY AS NEEDED  OMEPRAZOLE DR 40 MG CAPSULE: TAKE 1 CAPSULE BY MOUTH EVERY DAY 30 MIN BEFORE MORNING MEAL  ROSUVASTATIN 10MG TAB:   Tylenol: orally prn  UBRELVY 100 MG TABLET: TAKE 1 TABLET BY MOUTH AS ONE DOSE  Visine 0.05% ophthalmic solution:   Vitamin D3: orally once a day

## 2022-11-30 NOTE — PATIENT PROFILE ADULT - NS PRO AD NO ADVANCE DIRECTIVE
No
Expected Date Of Service: 10/19/2021
Performing Laboratory: [id number]
Lab Facility: [id number]
Bill For Surgical Tray: no
Billing Type: Third-Party Bill

## 2022-11-30 NOTE — DISCHARGE NOTE PROVIDER - HOSPITAL COURSE
68y/o F with severe fibromuscular dysplasia and multiple intracranial aneurysms (bilateral ophthalmic artery aneurysms, right cavernous carotid aneurysms).  Status post selective cerebral angiography/aneurysm embolization with stents placement 11/29. On dual anti-platelet therapy with aspirin and Brilinta.  Right groin site is soft without any ecchymosis, firmness, tenderness, active bleeding or erythema appreciated. Patient is neuro intact without any focal deficits appreciated.     Patient is currently pending an MRI/MRA NOVA for Neuro-IR clearance for discharge.          68y/o F with severe fibromuscular dysplasia and multiple intracranial aneurysms (bilateral ophthalmic artery aneurysms, right cavernous carotid aneurysms).  Status post selective cerebral angiography/aneurysm embolization with stents placement 11/29. On dual anti-platelet therapy with aspirin and Brilinta.  Right groin site is soft without any ecchymosis, firmness, tenderness, active bleeding or erythema appreciated. Patient is neuro intact without any focal deficits appreciated.  MRI/MRA NOVA completed and is WNL. From a Neuro-IR and medical standpoint the patient is medically cleared for discharge home with appropriate outpatient follow-up.

## 2022-11-30 NOTE — DISCHARGE NOTE NURSING/CASE MANAGEMENT/SOCIAL WORK - NSDCPETBCESMAN_GEN_ALL_CORE
If you are a smoker, it is important for your health to stop smoking. Please be aware that second hand smoke is also harmful. N/A

## 2022-11-30 NOTE — DISCHARGE NOTE PROVIDER - CARE PROVIDER_API CALL
Karen Grijalva (DO)  Family Medicine  [address] [place] [address]  Phone: [phone]  Fax: [phone]  Follow Up Time: 1-3 days    Wilfredo Mccartney; MS)  Unallocated  [address] [facility], [address] [place] [address]  Phone: [phone]  Fax: [phone]  Follow Up Time:    Karen Grijalva (DO)  Family Medicine  [address] [place] [address]  Phone: [phone]  Fax: [phone]  Follow Up Time: 1-3 days    Wilfredo Mccartney (MD; MS)  Unallocated  [address] [facility], [address] [place] [address]  Phone: [phone]  Fax: [phone]  Follow Up Time: 2 weeks   Karen Grijalva (DO)  Family Medicine  [address] [place] [address]  Phone: [phone]  Fax: [phone]  Follow Up Time: 1-3 days    Wilfredo Mccartney (MD; MS)  Unallocated  [address] [facility], [address] [place] [address]  Phone: [phone]  Fax: [phone]  Follow Up Time: 1 week

## 2022-11-30 NOTE — DISCHARGE NOTE PROVIDER - NSDCCPCAREPLAN_GEN_ALL_CORE_FT
PRINCIPAL DISCHARGE DIAGNOSIS  Diagnosis: Aneurysm, carotid artery, internal  Assessment and Plan of Treatment: You were admitted for further evalaution and management of your bilateral internal carotid aneurysms. During your hospital admission you successfully underwent a cerebral angiogram and had several stents placed. After your procedure you were then placed in the Neuro -ICU for close monitoring. Please continue to take all of your medications as directed, please follow all dietary recommendations, please maintain adequate hydration and nutrition, and please follow up with all of your healthcare providers as directed.         PRINCIPAL DISCHARGE DIAGNOSIS  Diagnosis: Aneurysm, carotid artery, internal  Assessment and Plan of Treatment: You were admitted for further evalaution and management of your bilateral internal carotid aneurysms. During your hospital admission you successfully underwent a cerebral angiogram and had several stents placed. After your procedure you were then placed in the Neuro -ICU for close monitoring. Please continue to take all of your medications as directed, please follow all dietary recommendations, please maintain adequate hydration and nutrition, and please follow up with all of your healthcare providers as directed.  Please follow-up with your neuro interventalist in 10 -14 days as directed. For your reference Dr. Mccartney's office and contact information is listed under the follow-up section of your discharge paperwork.

## 2022-11-30 NOTE — DISCHARGE NOTE NURSING/CASE MANAGEMENT/SOCIAL WORK - NSDCPEFALRISK_GEN_ALL_CORE
For information on Fall & Injury Prevention, visit: [email or web address] OR  [email or web address] OR  https://www.cdc.gov/steadi/patient.html

## 2022-11-30 NOTE — DISCHARGE NOTE PROVIDER - PROVIDER TOKENS
PROVIDER:[TOKEN:[[id number]],FOLLOWUP:[1-3 days]],PROVIDER:[TOKEN:[[id number]]] PROVIDER:[TOKEN:[[id number]],FOLLOWUP:[1-3 days]],PROVIDER:[TOKEN:[[id number]],FOLLOWUP:[2 weeks]] PROVIDER:[TOKEN:[[id number]],FOLLOWUP:[1-3 days]],PROVIDER:[TOKEN:[[id number]],FOLLOWUP:[1 week]]

## 2022-12-01 PROBLEM — E78.5 HYPERLIPIDEMIA, UNSPECIFIED: Chronic | Status: ACTIVE | Noted: 2022-11-25

## 2022-12-01 PROBLEM — I10 ESSENTIAL (PRIMARY) HYPERTENSION: Chronic | Status: ACTIVE | Noted: 2022-11-25

## 2022-12-01 PROBLEM — R01.1 CARDIAC MURMUR, UNSPECIFIED: Chronic | Status: ACTIVE | Noted: 2022-11-25

## 2022-12-01 PROBLEM — H40.9 UNSPECIFIED GLAUCOMA: Chronic | Status: ACTIVE | Noted: 2022-11-25

## 2022-12-07 ENCOUNTER — APPOINTMENT (OUTPATIENT)
Dept: NEUROSURGERY | Facility: CLINIC | Age: 67
End: 2022-12-07
Payer: COMMERCIAL

## 2022-12-07 VITALS
HEART RATE: 65 BPM | DIASTOLIC BLOOD PRESSURE: 63 MMHG | OXYGEN SATURATION: 100 % | HEIGHT: 65 IN | WEIGHT: 133 LBS | BODY MASS INDEX: 22.16 KG/M2 | SYSTOLIC BLOOD PRESSURE: 111 MMHG | TEMPERATURE: 97.7 F

## 2022-12-07 PROCEDURE — 99214 OFFICE O/P EST MOD 30 MIN: CPT

## 2022-12-07 NOTE — ASSESSMENT
[FreeTextEntry1] : IMPRESSION:\par 67F referred by Dr Zhao, [facility] of herniated disc, reported h/o coronary angiogram in the past, newly diagnosed hepatic lobe lesion and thyroid nodules followed by PCP Dr. 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 s/p embo of L ophthalmic aneurysm with flow-diverting pipeline stent 11/29/22.\par \par PCP: Dr. Karen Grijalva\par Neurologist: Dr. Case\par \par Patient doing neurologically clinically well. Reports improved left sided headaches. Denies other symptoms of motor, sensory, speech, or visual abnormalities. Feels as if she gets short of breath at times during talking. Explained this may be an uncommon side effect being on Brilinta. Will consider switching to Plavix if this persists or worsens. Reports some increase in bruising while on dual antiplatelet therapy, tender small hematoma on the left wrist s/p arterial line placement while in hospital. Assured this should get better over time, plan to follow up with phone call next week.\par Patient knows to call the office if there are any new or worsening symptoms.\par All questions and concerns answered and addressed in detail to patient's complete satisfaction.  Patient verbalized understanding and agreed to plan.\par \par \par \par PLAN: \par Continue ASA 81 daily and Brilinta 90mg BID\par MRI brain non con, MRA brain non con [facility] in 3 months – end of February 2023\par Follow up after to discuss results\par

## 2022-12-07 NOTE — REASON FOR VISIT
[Post Hospitalization] : a post hospitalization visit [Family Member] : family member [Spouse] : spouse [FreeTextEntry1] : s/p Cerebral angiogram and embolization of aneurysm with flow diversion 11/29/22 [facility]\par \par s/p Diagnostic Catheter Cerebral Angiogram 9/20/22 [facility]

## 2022-12-07 NOTE — HISTORY OF PRESENT ILLNESS
[FreeTextEntry1] : SNEHA WILLIS is a 67 year old right handed female referred by Dr. Zhao, [facility] of herniated disc, chronic headaches, heart murmur and palpitations, s/p cardiac catheterization per patient, newly diagnosed hepatic lobe lesion and thyroid nodules followed by PCP Dr. Karen Grijalva. Family history of thoracic and aortic dissections/aneurysms. Denies known family history of cerebral aneurysms or aneurysm rupture. She initially presented with headaches for 1-2 weeks at a time. She saw neurologist, Dr. Case who ordered MRI/MRA which demonstrated incidental right ICA aneurysm. She saw neurosurgeon, Dr. Garcia in [facility], and was told she has 2 aneurysms and she was recommended surgery. She presented to Dr. Zhao in June 2022 for a second opinion who confirms the presence of one aneurysm, unclear about the alleged second aneurysm. \par \par On 9/20/22, she underwent diagnostic cerebral angiogram which demonstrates left ophthalmic artery aneurysm 4.1 x 3.4mm, right ophthalmic artery aneurysm 3.2 x 2.9mm, 2 small R ICA cavernous aneurysms 2.4mm and 1.9mm, severe fibromuscular dysplasia within the cervical segments of the bilateral internal carotid arteries with aneurysmal dilatation measuring as wide as 1.6 cm on the right and 1.2 cm on the left, and moderate fibromuscular dysplasia involving the bilateral vertebral arteries. \par \par On 11/29/22, she underwent embolization of left ophthalmic aneurysm with flow-diverting stent. \par \par Today, she is feeling overall well. She reports some headaches after the procedure, primarily left sided behind her left eye but they have improved. Her headaches are relieved by OTC Tylenol. Compliant with ASA 81 and Brilinta 90mg BID. She states occasional shortness of breath while talking. Also reports some increase in bruising on left leg, and left wrist s/p arterial line while in hospital.

## 2022-12-07 NOTE — REVIEW OF SYSTEMS
[Shortness Of Breath] : shortness of breath [Negative] : Endocrine [As Noted in HPI] : as noted in HPI [Easy Bruising] : a tendency for easy bruising [de-identified] : Headaches [FreeTextEntry6] : at times while on Brilinta 90mg BID [de-identified] : While on dual antiplatelet therapy

## 2022-12-07 NOTE — PHYSICAL EXAM
[Person] : oriented to person [Place] : oriented to place [Time] : oriented to time [Cranial Nerves Optic (II)] : visual acuity intact bilaterally,  pupils equal round and reactive to light [Cranial Nerves Oculomotor (III)] : extraocular motion intact [Cranial Nerves Trigeminal (V)] : facial sensation intact symmetrically [Cranial Nerves Facial (VII)] : face symmetrical [Cranial Nerves Vestibulocochlear (VIII)] : hearing was intact bilaterally [Cranial Nerves Glossopharyngeal (IX)] : tongue and palate midline [Cranial Nerves Accessory (XI - Cranial And Spinal)] : head turning and shoulder shrug symmetric [Cranial Nerves Hypoglossal (XII)] : there was no tongue deviation with protrusion [Motor Tone] : muscle tone was normal in all four extremities [Motor Strength] : muscle strength was normal in all four extremities [Abnormal Walk] : normal gait [Balance] : balance was intact [FreeTextEntry6] : left wrist s/p arterial line placement in hospital - mild bruising, small tender hematoma

## 2023-01-01 NOTE — PATIENT PROFILE ADULT - NSTRANSFERBELONGINGSDISPO_GEN_A_NUR
[Normal Growth] : growth [Normal Development] : developmental [No Elimination Concerns] : elimination [Continue Regimen] : feeding [No Skin Concerns] : skin [Normal Sleep Pattern] : sleep [Term Infant] : term infant [None] : no known medical problems [ Transition] :  transition [ Care] :  care [Nutritional Adequacy] : nutritional adequacy [Safety] : safety [Parental Well-Being] : parental well-being [Hepatitis B In Hospital] : Hepatitis B administered while in the hospital [Parent/Guardian] : Parent/Guardian [FreeTextEntry1] : 3 day old female born FT via  presenting for  visit. Maternal prenatal labs negative. Growth and development normal. Has regained birthweight. PE remarkable for L hip click. Will obtain US of hip at 4-6 weeks. Maternal depression screen passed. CCHD and hearing screens passed. Will f/u NBS pending. Immunizations UTD.  HCM: - Routine  care & anticipatory guidance given - Continue ad harriet feeds at least every 2- 3 hours - Polyvisol as prescribed - Follow up NBS [id number] and G6PD - Follow up CMV sent from NBN for small HC - Hip US at 4-6 week and f/u with orthopedics - RTC 1 week days for weight check and prn - RTC for 1 month HCM and prn - Discussed STRICT precautions for seeking immediate medical attention including but not limited to fever of 100.4F or more, yellowing or increased yellowing of skin or eyes, redness, discharge or foul odor from umbilical stump, poor feeding, lethargy or decreased responsiveness, fast or labored breathing, less than 5 wet diapers daily, rash or any other concerning sign or symptom.  Caretaker expressed understanding of the plan and agrees. All questions were answered. not applicable

## 2023-03-20 ENCOUNTER — LABORATORY RESULT (OUTPATIENT)
Age: 68
End: 2023-03-20

## 2023-03-20 ENCOUNTER — APPOINTMENT (OUTPATIENT)
Dept: OBGYN | Facility: CLINIC | Age: 68
End: 2023-03-20
Payer: COMMERCIAL

## 2023-03-20 VITALS — SYSTOLIC BLOOD PRESSURE: 131 MMHG | WEIGHT: 140 LBS | DIASTOLIC BLOOD PRESSURE: 81 MMHG | BODY MASS INDEX: 23.3 KG/M2

## 2023-03-20 DIAGNOSIS — N95.2 POSTMENOPAUSAL ATROPHIC VAGINITIS: ICD-10-CM

## 2023-03-20 DIAGNOSIS — M81.0 AGE-RELATED OSTEOPOROSIS W/OUT CURRENT PATHOLOGICAL FRACTURE: ICD-10-CM

## 2023-03-20 PROCEDURE — 99397 PER PM REEVAL EST PAT 65+ YR: CPT

## 2023-03-20 PROCEDURE — 99213 OFFICE O/P EST LOW 20 MIN: CPT | Mod: 25

## 2023-04-06 PROBLEM — N95.2 VAGINAL ATROPHY: Status: ACTIVE | Noted: 2022-03-11

## 2023-04-06 PROBLEM — M81.0 OSTEOPOROSIS: Status: ACTIVE | Noted: 2021-03-08

## 2023-04-06 NOTE — HISTORY OF PRESENT ILLNESS
[FreeTextEntry1] : pt comes for pap . She has been using the vaginal HRT with good results and wants to continue . She had mammogram . Current on bone density .

## 2023-04-06 NOTE — PHYSICAL EXAM
[Appropriately responsive] : appropriately responsive [Alert] : alert [No Acute Distress] : no acute distress [No Lymphadenopathy] : no lymphadenopathy [Regular Rate Rhythm] : regular rate rhythm [No Murmurs] : no murmurs [Clear to Auscultation B/L] : clear to auscultation bilaterally [Soft] : soft [Non-tender] : non-tender [Non-distended] : non-distended [No HSM] : No HSM [No Lesions] : no lesions [No Mass] : no mass [Oriented x3] : oriented x3 [Examination Of The Breasts] : a normal appearance [No Masses] : no breast masses were palpable [Vulvar Atrophy] : vulvar atrophy [Labia Majora] : normal [Labia Minora] : normal [Atrophy] : atrophy [Normal] : normal

## 2023-04-06 NOTE — COUNSELING
[Nutrition/ Exercise/ Weight Management] : nutrition, exercise, weight management [Bladder Hygiene] : bladder hygiene [Vaccines] : vaccines

## 2023-04-15 ENCOUNTER — NON-APPOINTMENT (OUTPATIENT)
Age: 68
End: 2023-04-15

## 2023-04-19 ENCOUNTER — OUTPATIENT (OUTPATIENT)
Dept: OUTPATIENT SERVICES | Facility: HOSPITAL | Age: 68
LOS: 1 days | End: 2023-04-19
Payer: COMMERCIAL

## 2023-04-19 ENCOUNTER — APPOINTMENT (OUTPATIENT)
Dept: MRI IMAGING | Facility: HOSPITAL | Age: 68
End: 2023-04-19

## 2023-04-19 DIAGNOSIS — Z98.890 OTHER SPECIFIED POSTPROCEDURAL STATES: Chronic | ICD-10-CM

## 2023-04-19 DIAGNOSIS — Z90.89 ACQUIRED ABSENCE OF OTHER ORGANS: Chronic | ICD-10-CM

## 2023-04-19 DIAGNOSIS — I67.1 CEREBRAL ANEURYSM, NONRUPTURED: ICD-10-CM

## 2023-04-19 PROCEDURE — 70544 MR ANGIOGRAPHY HEAD W/O DYE: CPT

## 2023-04-19 PROCEDURE — 70551 MRI BRAIN STEM W/O DYE: CPT

## 2023-04-19 PROCEDURE — 70544 MR ANGIOGRAPHY HEAD W/O DYE: CPT | Mod: 26,59

## 2023-04-19 PROCEDURE — 70551 MRI BRAIN STEM W/O DYE: CPT | Mod: 26

## 2023-04-26 ENCOUNTER — APPOINTMENT (OUTPATIENT)
Dept: NEUROSURGERY | Facility: CLINIC | Age: 68
End: 2023-04-26
Payer: COMMERCIAL

## 2023-04-26 VITALS
TEMPERATURE: 97.1 F | DIASTOLIC BLOOD PRESSURE: 70 MMHG | SYSTOLIC BLOOD PRESSURE: 118 MMHG | OXYGEN SATURATION: 100 % | HEIGHT: 65 IN | WEIGHT: 140 LBS | BODY MASS INDEX: 23.32 KG/M2 | HEART RATE: 63 BPM

## 2023-04-26 PROCEDURE — 99214 OFFICE O/P EST MOD 30 MIN: CPT

## 2023-04-26 NOTE — HISTORY OF PRESENT ILLNESS
[FreeTextEntry1] : SNEHA WILLIS is a 68 year old right handed female referred by Dr. Zhao, [facility] of herniated disc, chronic headaches, heart murmur and palpitations, s/p cardiac catheterization per patient, newly diagnosed hepatic lobe lesion and thyroid nodules followed by PCP Dr. Karen Grijalva. Family history of thoracic and aortic dissections/aneurysms. Denies known family history of cerebral aneurysms or aneurysm rupture. She initially presented with headaches for 1-2 weeks at a time. She saw neurologist, Dr. Case who ordered MRI/MRA which demonstrated incidental right ICA aneurysm. She saw neurosurgeon, Dr. Garcia in [facility], and was told she has 2 aneurysms and she was recommended surgery. She presented to Dr. Zhao in June 2022 for a second opinion who confirms the presence of one aneurysm, unclear about the alleged second aneurysm. \par \par On 9/20/22, she underwent diagnostic cerebral angiogram which demonstrates left ophthalmic artery aneurysm 4.1 x 3.4mm, right ophthalmic artery aneurysm 3.2 x 2.9mm, 2 small R ICA cavernous aneurysms 2.4mm and 1.9mm, severe fibromuscular dysplasia within the cervical segments of the bilateral internal carotid arteries with aneurysmal dilatation measuring as wide as 1.6 cm on the right and 1.2 cm on the left, and moderate fibromuscular dysplasia involving the bilateral vertebral arteries. \par \par On 11/29/22, she underwent embolization of left ophthalmic aneurysm and left cervical carotid pseudoaneurysms with flow-diverting stents. \par \par PCP: Dr. Karen Grijalva\par Neurologist: Dr. Case\par \par Today, she is doing overall well. She reports improvement in her headaches. Compliant with ASA 81 and Brilinta 90mg BID. She states occasional shortness of breath, fatigue, bruising while on the Brilinta..

## 2023-04-26 NOTE — REVIEW OF SYSTEMS
[Feeling Tired] : feeling tired [As Noted in HPI] : as noted in HPI [Negative] : Endocrine [de-identified] : Headaches

## 2023-04-26 NOTE — REASON FOR VISIT
[Follow-Up: _____] : a [unfilled] follow-up visit [Spouse] : spouse [FreeTextEntry1] : Reviewed results of MRI brain non con, MRA brain non con [facility] done 4/19/23\par \par s/p Cerebral angiogram and embolization of aneurysm with flow diversion 11/29/22 [facility]\par \par s/p Diagnostic Catheter Cerebral Angiogram 9/20/22 [facility]

## 2023-05-17 ENCOUNTER — OUTPATIENT (OUTPATIENT)
Dept: OUTPATIENT SERVICES | Facility: HOSPITAL | Age: 68
LOS: 1 days | End: 2023-05-17
Payer: COMMERCIAL

## 2023-05-17 VITALS
SYSTOLIC BLOOD PRESSURE: 131 MMHG | TEMPERATURE: 98 F | HEIGHT: 63 IN | DIASTOLIC BLOOD PRESSURE: 91 MMHG | HEART RATE: 85 BPM | RESPIRATION RATE: 18 BRPM | OXYGEN SATURATION: 98 % | WEIGHT: 141.98 LBS

## 2023-05-17 DIAGNOSIS — I67.1 CEREBRAL ANEURYSM, NONRUPTURED: ICD-10-CM

## 2023-05-17 DIAGNOSIS — Z01.818 ENCOUNTER FOR OTHER PREPROCEDURAL EXAMINATION: ICD-10-CM

## 2023-05-17 DIAGNOSIS — Z29.9 ENCOUNTER FOR PROPHYLACTIC MEASURES, UNSPECIFIED: ICD-10-CM

## 2023-05-17 DIAGNOSIS — Z98.890 OTHER SPECIFIED POSTPROCEDURAL STATES: Chronic | ICD-10-CM

## 2023-05-17 DIAGNOSIS — Z90.89 ACQUIRED ABSENCE OF OTHER ORGANS: Chronic | ICD-10-CM

## 2023-05-17 LAB
ANION GAP SERPL CALC-SCNC: 10 MMOL/L — SIGNIFICANT CHANGE UP (ref 5–17)
BLD GP AB SCN SERPL QL: NEGATIVE — SIGNIFICANT CHANGE UP
BUN SERPL-MCNC: 16 MG/DL — SIGNIFICANT CHANGE UP (ref 7–23)
CALCIUM SERPL-MCNC: 9.5 MG/DL — SIGNIFICANT CHANGE UP (ref 8.4–10.5)
CHLORIDE SERPL-SCNC: 106 MMOL/L — SIGNIFICANT CHANGE UP (ref 96–108)
CO2 SERPL-SCNC: 25 MMOL/L — SIGNIFICANT CHANGE UP (ref 22–31)
CREAT SERPL-MCNC: 0.67 MG/DL — SIGNIFICANT CHANGE UP (ref 0.5–1.3)
EGFR: 96 ML/MIN/1.73M2 — SIGNIFICANT CHANGE UP
GLUCOSE SERPL-MCNC: 83 MG/DL — SIGNIFICANT CHANGE UP (ref 70–99)
HCT VFR BLD CALC: 38.8 % — SIGNIFICANT CHANGE UP (ref 34.5–45)
HGB BLD-MCNC: 12.7 G/DL — SIGNIFICANT CHANGE UP (ref 11.5–15.5)
MCHC RBC-ENTMCNC: 29.5 PG — SIGNIFICANT CHANGE UP (ref 27–34)
MCHC RBC-ENTMCNC: 32.7 GM/DL — SIGNIFICANT CHANGE UP (ref 32–36)
MCV RBC AUTO: 90.2 FL — SIGNIFICANT CHANGE UP (ref 80–100)
NRBC # BLD: 0 /100 WBCS — SIGNIFICANT CHANGE UP (ref 0–0)
PA ADP PRP-ACNC: 9 PRU — LOW (ref 194–417)
PLATELET # BLD AUTO: 236 K/UL — SIGNIFICANT CHANGE UP (ref 150–400)
PLATELET RESPONSE ASPIRIN RESULT: 520 ARU — SIGNIFICANT CHANGE UP (ref 350–700)
POTASSIUM SERPL-MCNC: 4.4 MMOL/L — SIGNIFICANT CHANGE UP (ref 3.5–5.3)
POTASSIUM SERPL-SCNC: 4.4 MMOL/L — SIGNIFICANT CHANGE UP (ref 3.5–5.3)
RBC # BLD: 4.3 M/UL — SIGNIFICANT CHANGE UP (ref 3.8–5.2)
RBC # FLD: 14 % — SIGNIFICANT CHANGE UP (ref 10.3–14.5)
RH IG SCN BLD-IMP: POSITIVE — SIGNIFICANT CHANGE UP
SODIUM SERPL-SCNC: 141 MMOL/L — SIGNIFICANT CHANGE UP (ref 135–145)
WBC # BLD: 5.04 K/UL — SIGNIFICANT CHANGE UP (ref 3.8–10.5)
WBC # FLD AUTO: 5.04 K/UL — SIGNIFICANT CHANGE UP (ref 3.8–10.5)

## 2023-05-17 PROCEDURE — 86901 BLOOD TYPING SEROLOGIC RH(D): CPT

## 2023-05-17 PROCEDURE — G0463: CPT

## 2023-05-17 PROCEDURE — 85027 COMPLETE CBC AUTOMATED: CPT

## 2023-05-17 PROCEDURE — 86900 BLOOD TYPING SEROLOGIC ABO: CPT

## 2023-05-17 PROCEDURE — 80048 BASIC METABOLIC PNL TOTAL CA: CPT

## 2023-05-17 PROCEDURE — 86850 RBC ANTIBODY SCREEN: CPT

## 2023-05-17 PROCEDURE — 85576 BLOOD PLATELET AGGREGATION: CPT

## 2023-05-17 RX ORDER — TETRAHYDROZOLINE/POLYETHYL GLY 0.05 %-1 %
0 DROPS OPHTHALMIC (EYE)
Qty: 0 | Refills: 0 | DISCHARGE

## 2023-05-17 NOTE — H&P PST ADULT - ASSESSMENT
Addended by: ETHEL GONZALES on: 5/10/2022 01:47 PM     Modules accepted: Orders     DASI score:  DASI activity:  Loose teeth or denture:      CAPRINI SCORE [CLOT]    AGE RELATED RISK FACTORS                                                       MOBILITY RELATED FACTORS  [ ] Age 41-60 years                                            (1 Point)                  [ ] Bed rest                                                        (1 Point)  [xx ] Age: 61-74 years                                           (2 Points)                 [ ] Plaster cast                                                   (2 Points)  [ ] Age= 75 years                                              (3 Points)                 [ ] Bed bound for more than 72 hours                 (2 Points)    DISEASE RELATED RISK FACTORS                                               GENDER SPECIFIC FACTORS  [ ] Edema in the lower extremities                       (1 Point)                  [ ] Pregnancy                                                     (1 Point)  [ ] Varicose veins                                               (1 Point)                  [ ] Post-partum < 6 weeks                                   (1 Point)             [xx ] BMI > 25 Kg/m2                                            (1 Point)                  [ ] Hormonal therapy  or oral contraception          (1 Point)                 [ ] Sepsis (in the previous month)                        (1 Point)                  [ ] History of pregnancy complications                 (1 point)  [ ] Pneumonia or serious lung disease                                               [ ] Unexplained or recurrent                     (1 Point)           (in the previous month)                               (1 Point)  [ ] Abnormal pulmonary function test                     (1 Point)                 SURGERY RELATED RISK FACTORS  [ ] Acute myocardial infarction                              (1 Point)                 [ ]  Section                                             (1 Point)  [ ] Congestive heart failure (in the previous month)  (1 Point)               [ ] Minor surgery                                                  (1 Point)   [ ] Inflammatory bowel disease                             (1 Point)                 [ ] Arthroscopic surgery                                        (2 Points)  [ ] Central venous access                                      (2 Points)                [ ] General surgery lasting more than 45 minutes   (2 Points)       [ ] Stroke (in the previous month)                          (5 Points)               [ ] Elective arthroplasty                                         (5 Points)                                                                                                                                               HEMATOLOGY RELATED FACTORS                                                 TRAUMA RELATED RISK FACTORS  [ ] Prior episodes of VTE                                     (3 Points)                [ ] Fracture of the hip, pelvis, or leg                       (5 Points)  [ ] Positive family history for VTE                         (3 Points)                 [ ] Acute spinal cord injury (in the previous month)  (5 Points)  [ ] Prothrombin 33553 A                                     (3 Points)                 [ ] Paralysis  (less than 1 month)                             (5 Points)  [ ] Factor V Leiden                                             (3 Points)                  [ ] Multiple Trauma within 1 month                        (5 Points)  [ ] Lupus anticoagulants                                     (3 Points)                                                           [ ] Anticardiolipin antibodies                               (3 Points)                                                       [ ] High homocysteine in the blood                      (3 Points)                                             [ ] Other congenital or acquired thrombophilia      (3 Points)                                                [ ] Heparin induced thrombocytopenia                  (3 Points)                                          Total Score [3]     DASI score:4.27  DASI activity: Performs ADL's independently. Requires frequent rest periods 2/2 shortness of breath. Able to walk 1 block uninterrupted   Loose teeth or denture: none      CAPRINI SCORE [CLOT]    AGE RELATED RISK FACTORS                                                       MOBILITY RELATED FACTORS  [ ] Age 41-60 years                                            (1 Point)                  [ ] Bed rest                                                        (1 Point)  [xx ] Age: 61-74 years                                           (2 Points)                 [ ] Plaster cast                                                   (2 Points)  [ ] Age= 75 years                                              (3 Points)                 [ ] Bed bound for more than 72 hours                 (2 Points)    DISEASE RELATED RISK FACTORS                                               GENDER SPECIFIC FACTORS  [ ] Edema in the lower extremities                       (1 Point)                  [ ] Pregnancy                                                     (1 Point)  [ ] Varicose veins                                               (1 Point)                  [ ] Post-partum < 6 weeks                                   (1 Point)             [xx ] BMI > 25 Kg/m2                                            (1 Point)                  [ ] Hormonal therapy  or oral contraception          (1 Point)                 [ ] Sepsis (in the previous month)                        (1 Point)                  [ ] History of pregnancy complications                 (1 point)  [ ] Pneumonia or serious lung disease                                               [ ] Unexplained or recurrent                     (1 Point)           (in the previous month)                               (1 Point)  [ ] Abnormal pulmonary function test                     (1 Point)                 SURGERY RELATED RISK FACTORS  [ ] Acute myocardial infarction                              (1 Point)                 [ ]  Section                                             (1 Point)  [ ] Congestive heart failure (in the previous month)  (1 Point)               [ ] Minor surgery                                                  (1 Point)   [ ] Inflammatory bowel disease                             (1 Point)                 [ ] Arthroscopic surgery                                        (2 Points)  [ ] Central venous access                                      (2 Points)                [ ] General surgery lasting more than 45 minutes   (2 Points)       [ ] Stroke (in the previous month)                          (5 Points)               [ ] Elective arthroplasty                                         (5 Points)                                                                                                                                               HEMATOLOGY RELATED FACTORS                                                 TRAUMA RELATED RISK FACTORS  [ ] Prior episodes of VTE                                     (3 Points)                [ ] Fracture of the hip, pelvis, or leg                       (5 Points)  [ ] Positive family history for VTE                         (3 Points)                 [ ] Acute spinal cord injury (in the previous month)  (5 Points)  [ ] Prothrombin 38851 A                                     (3 Points)                 [ ] Paralysis  (less than 1 month)                             (5 Points)  [ ] Factor V Leiden                                             (3 Points)                  [ ] Multiple Trauma within 1 month                        (5 Points)  [ ] Lupus anticoagulants                                     (3 Points)                                                           [ ] Anticardiolipin antibodies                               (3 Points)                                                       [ ] High homocysteine in the blood                      (3 Points)                                             [ ] Other congenital or acquired thrombophilia      (3 Points)                                                [ ] Heparin induced thrombocytopenia                  (3 Points)                                          Total Score [3]

## 2023-05-17 NOTE — H&P PST ADULT - NSICDXPASTMEDICALHX_GEN_ALL_CORE_FT
PAST MEDICAL HISTORY:  Back pain     Glaucoma     History of cerebral aneurysm     HLD (hyperlipidemia)     Hypertension     Lumbar herniated disc     Murmur, heart     Osteoporosis     Thyroid nodule

## 2023-05-17 NOTE — H&P PST ADULT - CARDIOVASCULAR
details… normal/regular rate and rhythm/S1 S2 present/no gallops/no rub normal/regular rate and rhythm/S1 S2 present/no gallops/no rub/murmur

## 2023-05-17 NOTE — H&P PST ADULT - HISTORY OF PRESENT ILLNESS
68F referred by Dr Zhao, [facility] of herniated disc, reported h/o coronary angiogram in the past, newly diagnosed hepatic lobe lesion and thyroid nodules followed by PCP Dr. 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 s/p embo of L ophthalmic aneurysm and left cervical carotid pseudoaneurysms with flow-diverting pipeline stents 11/29/22.      Denies Covid infection history.  Vaccinated and boosted with Moderna vaccine.  This is a 68 year old female with: PMH of herniated disc, hepatic lobe lesion and thyroid nodules followed by PCP Dr. 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 s/p embo of L ophthalmic aneurysm and left cervical carotid pseudoaneurysms with flow-diverting pipeline stents 11/29/22. Presenting to [facility] for scheduled for Cerebral Angiogram by Dr. Mccartney on 5/25/23.    Reports shortness of breath due to side effects of Brillenta, no wheezing, dyspnea. Denies headache, changes in vision, chest pain, palpitations, fever, chills,n/v.   Denies Covid infection history.  Vaccinated and boosted with Moderna vaccine.  This is a 68 year old female with headache found to have Cerebral Aneurysms 9/20/22. 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 S/p embo of L ophthalmic aneurysm and left cervical carotid pseudoaneurysms with flow-diverting pipeline stents 11/29/22. Presenting to [facility] for scheduled for Cerebral Angiogram by Dr. Mccartney on 5/25/23. Currently on Brillenta/ Aspirin.        Reports shortness of breath due to side effects of Brillenta, no wheezing, dyspnea. Denies headache, changes in vision, chest pain, palpitations, fever, chills,n/v.   Denies Covid infection history.  Vaccinated and boosted with Moderna vaccine.

## 2023-05-17 NOTE — H&P PST ADULT - PROBLEM SELECTOR PLAN 1
Scheduled for cerebral angiogram on 5/25/23  Pre-operative instructions given to patient.     Continue Aspirin 81mg and Brilinta as directed.  Labs: cbc, bmp, P2y12, Platelet ASA response  drawn in PST

## 2023-05-17 NOTE — H&P PST ADULT - NS PRO LACT YNNA
"Discharge Summary    Admission Date: 21    Discharge Date: 21    Diagnosis:Active Problems:    Anemia affecting pregnancy in third trimester     (spontaneous vaginal delivery)    Subjective: Pain controlled. Normal lochia. Eating, voiding and ambulating without difficulty. Denies dizziness.  Working on breast feeding.     /73   Pulse 78   Temp 36.7 °C (98.1 °F) (Temporal)   Resp 17   Ht 1.575 m (5' 2\")   Wt 40.8 kg (90 lb)   LMP 2021 (Exact Date)   SpO2 100%   Breastfeeding Unknown Comment: pumping  BMI 16.46 kg/m²     GEN: NAD  GI:soft, NT, ND  :fundus firm and below umbilicus  EXT:no edema    Hospital Course:Madyson Ocampo is a 24 yo  who presented at [facility] for IOL for A2DM. She also had a history of opioid use disorder and was on methadone. She is s/p  of a viable male infant with APGARS 8/9 on 21. EBL of 300cc. Left labial and second degree laceration repaired. She was anemic on admission and her hemoglobin improved post partum. She was meeting all post partum goals and was stable for discharge home on PPD#2.     Discharge Instructions   1. Diet : general  2. Activity: Pelvic rest for 6 weeks    Current Outpatient Medications   Medication Sig Dispense Refill   • ibuprofen (MOTRIN) 600 MG Tab Take 1 Tablet by mouth every 6 hours as needed (For cramping after delivery) 30 Tablet 0   • ferrous sulfate 325 (65 Fe) MG tablet Take 1 Tablet by mouth 2 times a day. 60 Tablet 0       Follow up: 6 weeks    Complications:none    Cooper Aragon M.D.        " no

## 2023-05-17 NOTE — H&P PST ADULT - RESPIRATORY
normal/clear to auscultation bilaterally/no wheezes/no rales/no rhonchi/no respiratory distress/no use of accessory muscles/good air movement/respirations non-labored

## 2023-05-25 ENCOUNTER — OUTPATIENT (OUTPATIENT)
Dept: OUTPATIENT SERVICES | Facility: HOSPITAL | Age: 68
LOS: 1 days | End: 2023-05-25
Payer: COMMERCIAL

## 2023-05-25 ENCOUNTER — TRANSCRIPTION ENCOUNTER (OUTPATIENT)
Age: 68
End: 2023-05-25

## 2023-05-25 ENCOUNTER — APPOINTMENT (OUTPATIENT)
Dept: NEUROSURGERY | Facility: HOSPITAL | Age: 68
End: 2023-05-25

## 2023-05-25 VITALS
RESPIRATION RATE: 18 BRPM | HEIGHT: 63 IN | WEIGHT: 139.99 LBS | TEMPERATURE: 99 F | DIASTOLIC BLOOD PRESSURE: 95 MMHG | SYSTOLIC BLOOD PRESSURE: 139 MMHG | OXYGEN SATURATION: 99 % | HEART RATE: 62 BPM

## 2023-05-25 VITALS
OXYGEN SATURATION: 100 % | RESPIRATION RATE: 16 BRPM | HEART RATE: 60 BPM | DIASTOLIC BLOOD PRESSURE: 78 MMHG | SYSTOLIC BLOOD PRESSURE: 135 MMHG

## 2023-05-25 DIAGNOSIS — Z98.890 OTHER SPECIFIED POSTPROCEDURAL STATES: Chronic | ICD-10-CM

## 2023-05-25 DIAGNOSIS — I67.1 CEREBRAL ANEURYSM, NONRUPTURED: ICD-10-CM

## 2023-05-25 DIAGNOSIS — Z90.89 ACQUIRED ABSENCE OF OTHER ORGANS: Chronic | ICD-10-CM

## 2023-05-25 PROCEDURE — 36227 PLACE CATH XTRNL CAROTID: CPT | Mod: 50

## 2023-05-25 PROCEDURE — 36227 PLACE CATH XTRNL CAROTID: CPT

## 2023-05-25 PROCEDURE — 36226 PLACE CATH VERTEBRAL ART: CPT | Mod: 50

## 2023-05-25 PROCEDURE — C1769: CPT

## 2023-05-25 PROCEDURE — C1887: CPT

## 2023-05-25 PROCEDURE — 36224 PLACE CATH CAROTD ART: CPT | Mod: 50

## 2023-05-25 PROCEDURE — 36226 PLACE CATH VERTEBRAL ART: CPT

## 2023-05-25 PROCEDURE — 36224 PLACE CATH CAROTD ART: CPT

## 2023-05-25 PROCEDURE — C1894: CPT

## 2023-05-25 RX ORDER — ROSUVASTATIN CALCIUM 5 MG/1
0 TABLET ORAL
Qty: 0 | Refills: 0 | DISCHARGE

## 2023-05-25 RX ORDER — TRAMADOL HYDROCHLORIDE 50 MG/1
25 TABLET ORAL ONCE
Refills: 0 | Status: DISCONTINUED | OUTPATIENT
Start: 2023-05-25 | End: 2023-05-25

## 2023-05-25 RX ORDER — ATENOLOL 25 MG/1
0 TABLET ORAL
Qty: 0 | Refills: 1 | DISCHARGE

## 2023-05-25 RX ORDER — UBROGEPANT 100 MG/1
0 TABLET ORAL
Qty: 0 | Refills: 0 | DISCHARGE

## 2023-05-25 RX ORDER — CHOLECALCIFEROL (VITAMIN D3) 125 MCG
0 CAPSULE ORAL
Qty: 0 | Refills: 0 | DISCHARGE

## 2023-05-25 RX ORDER — TICAGRELOR 90 MG/1
90 TABLET ORAL TWICE DAILY
Qty: 60 | Refills: 6 | Status: DISCONTINUED | COMMUNITY
Start: 2022-10-13 | End: 2023-05-25

## 2023-05-25 RX ORDER — ACETAMINOPHEN 500 MG
1000 TABLET ORAL ONCE
Refills: 0 | Status: COMPLETED | OUTPATIENT
Start: 2023-05-25 | End: 2023-05-25

## 2023-05-25 RX ORDER — OMEPRAZOLE 10 MG/1
0 CAPSULE, DELAYED RELEASE ORAL
Qty: 0 | Refills: 0 | DISCHARGE

## 2023-05-25 RX ORDER — ACETAMINOPHEN 500 MG
0 TABLET ORAL
Qty: 0 | Refills: 0 | DISCHARGE

## 2023-05-25 RX ORDER — TRAMADOL HYDROCHLORIDE 50 MG/1
25 TABLET ORAL ONCE
Refills: 0 | Status: DISCONTINUED | OUTPATIENT
Start: 2023-05-25 | End: 2023-06-08

## 2023-05-25 RX ORDER — MAGNESIUM OXIDE 400 MG ORAL TABLET 241.3 MG
0 TABLET ORAL
Qty: 0 | Refills: 5 | DISCHARGE

## 2023-05-25 RX ADMIN — Medication 400 MILLIGRAM(S): at 10:20

## 2023-05-25 RX ADMIN — TRAMADOL HYDROCHLORIDE 25 MILLIGRAM(S): 50 TABLET ORAL at 11:50

## 2023-05-25 RX ADMIN — TRAMADOL HYDROCHLORIDE 25 MILLIGRAM(S): 50 TABLET ORAL at 11:20

## 2023-05-25 RX ADMIN — Medication 1000 MILLIGRAM(S): at 10:50

## 2023-05-25 NOTE — CHART NOTE - NSCHARTNOTEFT_GEN_A_CORE
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 Bib MD  Fellow: MD Dr. Kingston Moore MD   Physician Assistant: Gabrielle Crane PA-C    RN: Anthony   Tech:     Anesthesia: Dr. Escobar (MAC)     I/Os:  Fluids:  Ty: DTV   Contrast:  Estimated Blood Loss: <10cc      Preliminary Report:  Under MAC, using a ___Fr short/long sheath to the right femoral artery examination of left internal carotid artery/  right internal carotid artery via selective cerebral angiography demonstrates ________. ( Official note to follow).    Patient tolerated procedure well, vital signs stable, hemodynamically stable, no change in neurological status compared to baseline. Results discussed with patient and their family. Groin sheath d/c'ed, manual compression held to hemostasis, no active bleeding, no hematoma, Vascade applied, quick clot and safeguard balloon dressing applied at _____h.  Patient transferred to IR recovery for further care/ monitoring. 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 Bib MD  Fellow:   Dr. Kingston MD   Physician Assistant: Gabrielle Craen PA-C    RN: Anthony   Tech:  Brandin/ Luis     Anesthesia: Dr. Escobar (MAC)     I/Os:  Fluids:  50cc   Ty: DTV   Contrast: 76cc   Estimated Blood Loss: <10cc      Preliminary Report:  Under MAC, using a 5/4Fr short sheath to the right radal artery examination of left internal carotid artery/  right internal carotid artery/ left vert via selective cerebral angiography demonstrates no interval change of right, and improvement on left . ( Official note to follow).    Patient tolerated procedure well, vital signs stable, hemodynamically stable, no change in neurological status compared to baseline. Results discussed with patient and their family. Radial sheath d/c and quickclot dressing placed at 915, no bleeding or hematoma Patient transferred to IR recovery for further care/ monitoring.

## 2023-05-25 NOTE — CHART NOTE - NSCHARTNOTEFT_GEN_A_CORE
Interventional Neuro Radiology  Pre-Procedure Note    This is a 68 year old female with headache found to have cerebral aneurysms 9/20/22, b/l ophthalmic artery aneurysms R 3.2 x 2.9mm, L 4.1 x 3.4mm, 2 R cavernous carotid aneurysms 2.4mm and 1.9mm, and severe fibromuscular dysplasia involving the bilateral cervical internal carotid and vertebral arteries with dissecting pseudoaneurysms of both cervical carotids, s/p embo of L ophthalmic aneurysm and left cervical carotid pseudoaneurysms with flow-diverting pipeline stents 11/29/22.     Neuro Exam: Awake and alert, oriented x3, fluent, normal naming and repetition, follows 3 step commands. Extraocular movements intact, no nystagmus, visual fields full, face symmetric, tongue midline. No drift, 5/5 power x 4 extremities. Normal sensation to LT. Normal finger-to-nose and rapid alternating movements.    PAST MEDICAL & SURGICAL HISTORY:  Lumbar herniated disc  Osteoporosis  Back pain  History of cerebral aneurysm  Thyroid nodule  Hypertension  Murmur, heart  HLD (hyperlipidemia)  Glaucoma  H/O coronary angiogram  History of tonsillectomy    Social History:   Denies tobacco use    FAMILY HISTORY:  FH: arterial aneurysm (Mother, Aunt)    Allergies:   No Known Allergies      Current Medications:   · 	BRILINTA 90 MG TABLET: Last Dose Taken:  , 1 tab(s) orally 2 times a day MDD:2  · 	aspirin 81 mg oral delayed release tablet: Last Dose Taken:  , 1 tab(s) orally once a day  · 	ATENOLOL 25 MG TABLET: Last Dose Taken:  , each orally once a day (at bedtime)  · 	Tylenol: Last Dose Taken:  , orally prn  · 	OMEPRAZOLE DR 40 MG CAPSULE: Last Dose Taken:  , TAKE 1 CAPSULE BY MOUTH EVERY DAY 30 MIN BEFORE MORNING MEAL  · 	Vitamin D3: Last Dose Taken:  , orally once a day  · 	MAGNESIUM OXIDE 400 MG TABLET: Last Dose Taken:  , TAKE 1 TABLET ORALLY DAILY AS NEEDED  · 	ROSUVASTATIN 10MG TAB: Last Dose Taken:    · 	UBRELVY 100 MG TABLET: Last Dose Taken:  , TAKE 1 TABLET BY MOUTH AS ONE DOSE    Assessment/Plan:   This is a 69yo female  presents with multiple cerebral aneurysms s/p embo. Patient presents to neuro-IR for selective cerebral angiography. Procedure/ risks/ benefits/ goals/ alternatives were explained. Risks include but are not limited to stroke/ vessel injury/ hemorrhage/ groin hematoma. All questions answered. Informed content obtained from patient. Consent placed in chart.    Gabrielle Crane PA-C  [phone]

## 2023-05-25 NOTE — ASU PATIENT PROFILE, ADULT - FALL HARM RISK - UNIVERSAL INTERVENTIONS
Bed in lowest position, wheels locked, appropriate side rails in place/Call bell, personal items and telephone in reach/Instruct patient to call for assistance before getting out of bed or chair/Non-slip footwear when patient is out of bed/[place] to call system/Physically safe environment - no spills, clutter or unnecessary equipment/Purposeful Proactive Rounding/Room/bathroom lighting operational, light cord in reach

## 2023-05-25 NOTE — ASU DISCHARGE PLAN (ADULT/PEDIATRIC) - CARE PROVIDER_API CALL
Wilfredo Mccartney  Neurosurgery  [address] [facility] [address]  [place] [address]  Phone: [phone]  Fax: [phone]  Follow Up Time:
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
## 2023-06-02 DIAGNOSIS — I67.1 CEREBRAL ANEURYSM, NONRUPTURED: ICD-10-CM

## 2023-07-05 ENCOUNTER — APPOINTMENT (OUTPATIENT)
Dept: NEUROSURGERY | Facility: CLINIC | Age: 68
End: 2023-07-05
Payer: COMMERCIAL

## 2023-07-05 VITALS
DIASTOLIC BLOOD PRESSURE: 72 MMHG | HEIGHT: 63 IN | SYSTOLIC BLOOD PRESSURE: 122 MMHG | HEART RATE: 69 BPM | TEMPERATURE: 98.5 F | OXYGEN SATURATION: 96 %

## 2023-07-05 PROCEDURE — 99214 OFFICE O/P EST MOD 30 MIN: CPT

## 2023-07-05 RX ORDER — MULTIVITAMIN
TABLET ORAL
Refills: 0 | Status: DISCONTINUED | COMMUNITY
End: 2023-07-05

## 2023-07-05 RX ORDER — CLOPIDOGREL BISULFATE 75 MG/1
75 TABLET, FILM COATED ORAL DAILY
Qty: 90 | Refills: 1 | Status: DISCONTINUED | COMMUNITY
Start: 2023-05-25 | End: 2023-07-05

## 2023-07-05 RX ORDER — ASPIRIN 81 MG/1
81 TABLET, CHEWABLE ORAL DAILY
Qty: 90 | Refills: 3 | Status: DISCONTINUED | COMMUNITY
Start: 2022-10-13 | End: 2023-07-05

## 2023-07-05 NOTE — PHYSICAL EXAM
[Place] : oriented to place [Person] : oriented to person [Time] : oriented to time [Motor Tone] : muscle tone was normal in all four extremities [Motor Strength] : muscle strength was normal in all four extremities [Abnormal Walk] : normal gait [Balance] : balance was intact

## 2023-07-06 NOTE — ASSESSMENT
[FreeTextEntry1] : IMPRESSION:\par 68F with PMH of herniated disc, reported h/o coronary angiogram in the past, hepatic lobe lesion and thyroid nodules, p/w HA, found on angio at [facility] on 9/20/23 to have Incidental unruptured b/l ophthalmic artery aneurysms, R 3.2 x 2.9mm, L 4.1 x 3.4mm, two R cavernous carotid aneurysms 2.4mm and 1.9mm, severe b/l cervical ICA and vertebral artery fibromuscular dysplasia, s/p embo of unruptured L ophthalmic aneurysm and L cervical carotid pseudoaneurysms with FDS 11/29/22. s/p 6 month f/u angio 5/25/23 demonstrating complete occlusion of previously embolized L ophthalmic aneurysm, near complete occlusion of previously embolized cervical and petrous L ICA dissecting pseudoaneurysms, no change in unruptured R ophthalmic and cavernous ICA aneurysms. On ASA 81 and switched to Plavix 75mg daily instead of Brilinta. \par \par Patient doing overall well since Brilinta discontinued and resolution of side effects. Feeling much better. Reports continued bruising with aspirin and Plavix, not worsening compared to when she was on aspirin and Brilinta. Reports mild occasional headaches but not bothersome, otherwise denies other symptoms of motor, sensory, speech, or visual abnormalities. \par \par Given she still has bruising while on DAPT, will give her some time to heal before planning to treat the unruptured right ophthalmic aneurysm and R cervical carotid dissecting pseudoaneurysms. Assured there is no urgency to treatment for these aneurysms. Patient prefers to hold off for now and check in 6 months with new MRA. \par All questions and concerns answered and addressed in detail to patient's complete satisfaction. Patient verbalized understanding and agreed to plan.\par \par \par \par PLAN:\par Stable R sided aneurysms, and FMD-related pseudoaneurysms, will defer treatment for now as patient prefers to take a break from DAPT\par Discontinue Plavix 75mg for now\par Increase aspirin to 325mg daily from 81mg\par MRA head non con NOVA, MRI head non con in 6 months post-angio - mid to late November 2023\par Follow up visit after to review results\par May consider treating the R side depending on the MRA results

## 2023-07-06 NOTE — REASON FOR VISIT
[Post Hospitalization] : a post hospitalization visit [Family Member] : family member [FreeTextEntry1] : s/p 6 month follow up cerebral angiogram 5/25/23 [facility]\par \par s/p Cerebral angiogram and embolization of aneurysm with flow diversion 11/29/22 [facility]\par \par s/p Diagnostic Catheter Cerebral Angiogram 9/20/22 [facility] \par

## 2023-07-06 NOTE — HISTORY OF PRESENT ILLNESS
[FreeTextEntry1] : SNEHA WILLIS is a 68 year old right handed female referred by Dr. Zhao, [facility] of herniated disc, chronic headaches, heart murmur and palpitations, s/p cardiac catheterization per patient, newly diagnosed hepatic lobe lesion and thyroid nodules followed by PCP Dr. Karen Grijalva. Family history of thoracic and aortic dissections/aneurysms. Denies known family history of cerebral aneurysms or aneurysm rupture. She initially presented with headaches for 1-2 weeks at a time. She saw neurologist, Dr. Case who ordered MRI/MRA which demonstrated incidental right ICA aneurysm. She saw neurosurgeon, Dr. Garcia in [facility], and was told she has 2 aneurysms and she was recommended surgery. She presented to Dr. Zhao in June 2022 for a second opinion who confirms the presence of one aneurysm, unclear about the alleged second aneurysm. \par \par On 9/20/22, she underwent diagnostic cerebral angiogram which demonstrates unruptured bilateral ophthalmic artery aneurysms, - left ophthalmic artery aneurysm 4.1 x 3.4mm, right ophthalmic artery aneurysm 3.2 x 2.9mm, 2 small R ICA cavernous aneurysms 2.4mm and 1.9mm, severe fibromuscular dysplasia within the cervical segments of the bilateral internal carotid arteries with aneurysmal dilatation measuring as wide as 1.6 cm on the right and 1.2 cm on the left, and moderate fibromuscular dysplasia involving the bilateral vertebral arteries. \par \par On 11/29/22, she underwent embolization of left ophthalmic aneurysm and left cervical carotid pseudoaneurysms with flow-diverting pipeline stents. 3 month MRA NOVA/MRI 4/19/23 showed good intracranial flow, no acute infarcts, stable fibromuscular dysplasia and untreated unruptured R cavernous carotid aneurysms. On 5/25/23, she underwent 6 month follow up cerebral angiogram demonstrating complete occlusion of previously embolized L ophthalmic artery aneurysm, near complete occlusion of previously embolized cervical and petrous L ICA dissecting pseudoaneurysms, no change in unruptured R ophthalmic and cavernous ICA aneurysms, no change in severe fibromuscular dysplasia and dissecting aneurysm within the L cervical and petrous ICA, unchanged moderate fibromuscular dysplasia within the V1, V2, and V3 segments of the b/l vertebral arteries. \par \par PCP: Dr. Karen Grijalva\par Neurologist: Dr. Case\par \par Today, she is feeling overall well since the follow up angiogram procedure. She started Plavix 75mg daily after the angiogram and discontinued Brilinta. Her prior side effects from the Brilinta such as shortness of breath and fatigue has resolved. Still has bruising but not worsened. States she has mild occasional headaches.

## 2023-10-04 RX ORDER — ASPIRIN 325 MG/1
325 TABLET, FILM COATED ORAL
Qty: 90 | Refills: 1 | Status: DISCONTINUED | COMMUNITY
Start: 2023-07-05 | End: 2023-10-04

## 2023-11-13 ENCOUNTER — OUTPATIENT (OUTPATIENT)
Dept: OUTPATIENT SERVICES | Facility: HOSPITAL | Age: 68
LOS: 1 days | End: 2023-11-13
Payer: COMMERCIAL

## 2023-11-13 ENCOUNTER — APPOINTMENT (OUTPATIENT)
Dept: MRI IMAGING | Facility: HOSPITAL | Age: 68
End: 2023-11-13

## 2023-11-13 DIAGNOSIS — Z98.890 OTHER SPECIFIED POSTPROCEDURAL STATES: Chronic | ICD-10-CM

## 2023-11-13 DIAGNOSIS — I67.1 CEREBRAL ANEURYSM, NONRUPTURED: ICD-10-CM

## 2023-11-13 DIAGNOSIS — Z90.89 ACQUIRED ABSENCE OF OTHER ORGANS: Chronic | ICD-10-CM

## 2023-11-13 PROCEDURE — 70551 MRI BRAIN STEM W/O DYE: CPT

## 2023-11-13 PROCEDURE — 70544 MR ANGIOGRAPHY HEAD W/O DYE: CPT | Mod: 26,59

## 2023-11-13 PROCEDURE — 70551 MRI BRAIN STEM W/O DYE: CPT | Mod: 26

## 2023-11-13 PROCEDURE — 70547 MR ANGIOGRAPHY NECK W/O DYE: CPT | Mod: 26

## 2023-11-13 PROCEDURE — 70547 MR ANGIOGRAPHY NECK W/O DYE: CPT

## 2023-11-13 PROCEDURE — 70544 MR ANGIOGRAPHY HEAD W/O DYE: CPT

## 2023-11-22 ENCOUNTER — APPOINTMENT (OUTPATIENT)
Dept: NEUROSURGERY | Facility: CLINIC | Age: 68
End: 2023-11-22
Payer: COMMERCIAL

## 2023-11-22 VITALS
BODY MASS INDEX: 25.69 KG/M2 | OXYGEN SATURATION: 98 % | DIASTOLIC BLOOD PRESSURE: 81 MMHG | WEIGHT: 145 LBS | TEMPERATURE: 98.4 F | HEIGHT: 63 IN | SYSTOLIC BLOOD PRESSURE: 128 MMHG | HEART RATE: 63 BPM

## 2023-11-22 PROCEDURE — 99215 OFFICE O/P EST HI 40 MIN: CPT

## 2023-12-06 ENCOUNTER — APPOINTMENT (OUTPATIENT)
Dept: OTOLARYNGOLOGY | Facility: CLINIC | Age: 68
End: 2023-12-06
Payer: COMMERCIAL

## 2023-12-06 VITALS
SYSTOLIC BLOOD PRESSURE: 147 MMHG | BODY MASS INDEX: 25.69 KG/M2 | HEIGHT: 63 IN | WEIGHT: 145 LBS | TEMPERATURE: 97.6 F | DIASTOLIC BLOOD PRESSURE: 87 MMHG | HEART RATE: 70 BPM

## 2023-12-06 DIAGNOSIS — R07.0 PAIN IN THROAT: ICD-10-CM

## 2023-12-06 DIAGNOSIS — M27.0 DEVELOPMENTAL DISORDERS OF JAWS: ICD-10-CM

## 2023-12-06 PROCEDURE — 92557 COMPREHENSIVE HEARING TEST: CPT

## 2023-12-06 PROCEDURE — 99203 OFFICE O/P NEW LOW 30 MIN: CPT | Mod: 25

## 2023-12-06 PROCEDURE — 31575 DIAGNOSTIC LARYNGOSCOPY: CPT

## 2023-12-06 PROCEDURE — 92567 TYMPANOMETRY: CPT

## 2024-03-15 NOTE — ASSESSMENT
[FreeTextEntry1] : IMPRESSION:\par 68F referred by Dr Zhao, [facility] of herniated disc, reported h/o coronary angiogram in the past, newly diagnosed hepatic lobe lesion and thyroid nodules followed by PCP Dr. Karen Grijalva, p/w CASANOVA, found on angio at [facility] on 9/20 to have b/l ophthalmic artery aneurysms R 3.2 x 2.9mm, L 4.1 x 3.4mm, 2 R cavernous carotid aneurysms 2.4mm and 1.9mm, and severe fibromuscular dysplasia involving the bilateral cervical internal carotid and vertebral arteries with dissecting pseudoaneurysms of both cervical carotids. s/p embo of L ophthalmic aneurysm and left cervical carotid pseudoaneurysms with flow-diverting pipeline stents 11/29/22.\par \par Patient reports continued improved left sided headaches. Denies other symptoms of motor, sensory, speech, or visual abnormalities. Reports SOB, fatigue, depression-like feelings while on Brilinta. Explained these may be an uncommon side effects being on Brilinta. Will consider switching to Plavix if this persists or worsens. Reports some increase in bruising while on dual antiplatelet therapy. Patient knows to call the office if there are any new or worsening symptoms.\par All questions and concerns answered and addressed in detail to patient's complete satisfaction. Patient verbalized understanding and agreed to plan.\par \par MRI/MRA NOVA 4/19/23 shows good intracranial flow, no acute infarcts. No major changes of the untreated unruptured R cavernous carotid aneurysms and the fibromuscular dysplasia involving the bilateral cervical internal carotid and vertebral arteries with dissecting pseudoaneurysms of both cervical carotids.\par \par \par \par \par PLAN:\par Continue ASA 81 daily and Brilinta 90mg BID\par 6 month follow up angiogram end of May 2023 - will evaluate the right sided aneurysms in addition to the stented L aneurysms - to be scheduled 5/25/23\par Regarding the R sided aneurysms and FMD-related pseudoaneurysm, will evaluate at the 6 month angio and if stable will hold off on treating this side for now, as patient prefers to take a break from DAPT for now. \par 
negative

## 2024-03-25 ENCOUNTER — LABORATORY RESULT (OUTPATIENT)
Age: 69
End: 2024-03-25

## 2024-03-25 ENCOUNTER — APPOINTMENT (OUTPATIENT)
Dept: OBGYN | Facility: CLINIC | Age: 69
End: 2024-03-25
Payer: COMMERCIAL

## 2024-03-25 VITALS — BODY MASS INDEX: 25.51 KG/M2 | WEIGHT: 144 LBS | DIASTOLIC BLOOD PRESSURE: 96 MMHG | SYSTOLIC BLOOD PRESSURE: 155 MMHG

## 2024-03-25 DIAGNOSIS — Z01.419 ENCOUNTER FOR GYNECOLOGICAL EXAMINATION (GENERAL) (ROUTINE) W/OUT ABNORMAL FINDINGS: ICD-10-CM

## 2024-03-25 PROCEDURE — 99397 PER PM REEVAL EST PAT 65+ YR: CPT

## 2024-03-25 NOTE — PHYSICAL EXAM
[Appropriately responsive] : appropriately responsive [No Acute Distress] : no acute distress [Alert] : alert [No Lymphadenopathy] : no lymphadenopathy [Regular Rate Rhythm] : regular rate rhythm [Clear to Auscultation B/L] : clear to auscultation bilaterally [No Murmurs] : no murmurs [Soft] : soft [Non-tender] : non-tender [Non-distended] : non-distended [No HSM] : No HSM [No Mass] : no mass [No Lesions] : no lesions [Oriented x3] : oriented x3 [Examination Of The Breasts] : a normal appearance [No Masses] : no breast masses were palpable [Labia Majora] : normal [Labia Minora] : normal [Uterine Adnexae] : normal [Normal] : normal

## 2024-06-21 ENCOUNTER — OUTPATIENT (OUTPATIENT)
Dept: OUTPATIENT SERVICES | Facility: HOSPITAL | Age: 69
LOS: 1 days | End: 2024-06-21
Payer: COMMERCIAL

## 2024-06-21 ENCOUNTER — APPOINTMENT (OUTPATIENT)
Dept: MRI IMAGING | Facility: HOSPITAL | Age: 69
End: 2024-06-21

## 2024-06-21 DIAGNOSIS — I67.1 CEREBRAL ANEURYSM, NONRUPTURED: ICD-10-CM

## 2024-06-21 DIAGNOSIS — Z98.890 OTHER SPECIFIED POSTPROCEDURAL STATES: Chronic | ICD-10-CM

## 2024-06-21 DIAGNOSIS — Z90.89 ACQUIRED ABSENCE OF OTHER ORGANS: Chronic | ICD-10-CM

## 2024-06-21 PROCEDURE — 70544 MR ANGIOGRAPHY HEAD W/O DYE: CPT | Mod: 26,59

## 2024-06-21 PROCEDURE — 70547 MR ANGIOGRAPHY NECK W/O DYE: CPT

## 2024-06-21 PROCEDURE — 70547 MR ANGIOGRAPHY NECK W/O DYE: CPT | Mod: 26

## 2024-06-21 PROCEDURE — 70551 MRI BRAIN STEM W/O DYE: CPT

## 2024-06-21 PROCEDURE — 70544 MR ANGIOGRAPHY HEAD W/O DYE: CPT

## 2024-06-21 PROCEDURE — 70551 MRI BRAIN STEM W/O DYE: CPT | Mod: 26

## 2024-06-25 ENCOUNTER — NON-APPOINTMENT (OUTPATIENT)
Age: 69
End: 2024-06-25

## 2024-06-26 ENCOUNTER — APPOINTMENT (OUTPATIENT)
Dept: NEUROSURGERY | Facility: CLINIC | Age: 69
End: 2024-06-26
Payer: COMMERCIAL

## 2024-06-26 ENCOUNTER — NON-APPOINTMENT (OUTPATIENT)
Age: 69
End: 2024-06-26

## 2024-06-26 VITALS
SYSTOLIC BLOOD PRESSURE: 143 MMHG | OXYGEN SATURATION: 96 % | BODY MASS INDEX: 24.63 KG/M2 | DIASTOLIC BLOOD PRESSURE: 76 MMHG | WEIGHT: 139 LBS | TEMPERATURE: 98.2 F | HEART RATE: 72 BPM | HEIGHT: 63 IN

## 2024-06-26 DIAGNOSIS — I67.1 CEREBRAL ANEURYSM, NONRUPTURED: ICD-10-CM

## 2024-06-26 PROCEDURE — 99215 OFFICE O/P EST HI 40 MIN: CPT

## 2024-06-26 RX ORDER — ATENOLOL 25 MG/1
25 TABLET ORAL
Refills: 0 | Status: DISCONTINUED | COMMUNITY
End: 2024-06-26

## 2024-06-26 RX ORDER — CIPROFLOXACIN HYDROCHLORIDE 500 MG/1
500 TABLET, FILM COATED ORAL
Qty: 10 | Refills: 0 | Status: DISCONTINUED | COMMUNITY
Start: 2021-03-08 | End: 2024-06-26

## 2024-06-26 RX ORDER — ASPIRIN 325 MG/1
325 TABLET, COATED ORAL
Qty: 90 | Refills: 1 | Status: DISCONTINUED | COMMUNITY
Start: 2023-10-03 | End: 2024-06-26

## 2024-06-26 RX ORDER — ESTRADIOL 0.1 MG/G
0.1 CREAM VAGINAL
Qty: 1 | Refills: 6 | Status: DISCONTINUED | COMMUNITY
Start: 2022-03-11 | End: 2024-06-26

## 2024-06-26 RX ORDER — MV,IRON,MINS/FOLIC ACID/BIOTIN 66.7 MCG
CAPSULE ORAL
Refills: 0 | Status: DISCONTINUED | COMMUNITY
End: 2024-06-26

## 2024-06-26 RX ORDER — ASPIRIN 81 MG/1
81 TABLET, CHEWABLE ORAL DAILY
Qty: 90 | Refills: 3 | Status: ACTIVE | COMMUNITY
Start: 2024-06-26 | End: 1900-01-01

## 2024-06-26 RX ORDER — IBANDRONATE SODIUM 150 MG/1
150 TABLET ORAL
Refills: 0 | Status: DISCONTINUED | COMMUNITY
End: 2024-06-26

## 2024-06-26 NOTE — REASON FOR VISIT
[Follow-Up: _____] : a [unfilled] follow-up visit [Spouse] : spouse [FreeTextEntry1] : Reviewed MRA head non con NOVA, MRI head non con done 6/21/24 s/p 6 month follow up cerebral angiogram 5/25/23 [facility] s/p Cerebral angiogram and embolization of aneurysm with flow diversion 11/29/22 [facility] s/p Diagnostic Catheter Cerebral Angiogram 9/20/22 [facility]

## 2024-06-26 NOTE — ASSESSMENT
[FreeTextEntry1] : IMPRESSION: 69F with PMH of herniated disc, migraines, reported h/o coronary angiogram in the past, hepatic lobe lesion and thyroid nodules, p/w HA, found on angio at [facility] on 9/20/23 to have Incidental unruptured b/l ophthalmic artery aneurysms, R 3.2 x 2.9mm, L 4.1 x 3.4mm, two R cavernous carotid aneurysms 2.4mm and 1.9mm, severe b/l cervical ICA and vertebral artery fibromuscular dysplasia, s/p embo of unruptured L ophthalmic aneurysm and L cervical carotid pseudoaneurysms with FDS 11/29/22. s/p 6 month f/u angio 5/25/23 demonstrating complete occlusion of previously embolized L ophthalmic aneurysm, near complete occlusion of previously embolized cervical and petrous L ICA dissecting pseudoaneurysms, no change in unruptured R ophthalmic and cavernous ICA aneurysms. Discontinued Plavix 7/5/23, currently on .  MRI/MRA NOVA 6/21/24 is stable based on my read, no significant changes. Complete occlusion of previously embolized L ophthalmic aneurysm, patent stent with mild narrowing of the left cervical ICA, dilatation of the distal right cervical ICA consistent with FMD and pseudoaneurysm formation, no change in unruptured R ophthalmic and cavernous ICA aneurysms. Good intracranial flow. No evidence of stroke. Official radiology report not available at time of visit.  Continues to do overall well. Reports headaches with relief from Excedrin, Tylenol PM, follows neurologist Dr. Thom Case. Otherwise denies other symptoms of motor, sensory, speech, or visual abnormalities. Also reports episode of facial spasm on the left side only when speaking while in the [place] that resolved within a couple weeks, denies facial drooping. No loss of vision, reports floaters.  It is unclear what this episode what is related to. There does not appear to be any new strokes on this new MRI.   Plan to treat eventually both the unruptured right ophthalmic aneurysm which is under 4mm, and R cervical carotid dissecting pseudoaneurysms. Assured there is no urgency to treatment for these aneurysms. The right ophthalmic aneurysm is very low risk of rupture given the small size. Regarding starting DAPT prior to treatment, will consider Plavix in addition to aspirin given patient's issues with SOB while previously on Brilinta. Educated the patient on signs and symptoms of aneurysm rupture or subarachnoid hemorrhage, which is a life-threatening condition. Should she have severe, sudden onset worst headache of her life, advised that she must seek medical attention immediately and go to the emergency room if this occurs. Stroke is the major risk associated with the R cervical carotid pseudoaneurysms and FMD. Patient prefers to hold off for now and give some more time to heal before proceeding with treatment.   PLAN: Decrease to aspirin 81mg daily and indefinitely Will call patient if anything concerning on official radiology report Stable R sided aneurysms, and FMD-related pseudoaneurysms, will defer treatment for now as patient prefers more time to heal Continue aspirin 325mg daily Patient will reach out to office when she is ready to proceed with treatment for her unruptured right ophthalmic aneurysm and right cervical carotid pseudoaneurysms If patient would like to proceed with conservative management, repeat MRA head and neck non contrast in 1 year - June 2025 Follow up after for review

## 2024-06-26 NOTE — HISTORY OF PRESENT ILLNESS
[FreeTextEntry1] : SNEHA MCNEILL is a 69 year old right hand dominant female referred by Dr. Zhao, [facility] of herniated disc, chronic headaches/migraines, heart murmur and palpitations, s/p cardiac catheterization per patient, newly diagnosed hepatic lobe lesion and thyroid nodules followed by PCP Dr. Karen Grijalva. Family history of thoracic and aortic dissections/aneurysms. Denies known family history of cerebral aneurysms or aneurysm rupture. She initially presented with headaches for 1-2 weeks at a time. She saw neurologist, Dr. Case who ordered MRI/MRA which demonstrated incidental right ICA aneurysm. She saw neurosurgeon, Dr. Garcia in [facility], and was told she has 2 aneurysms and she was recommended surgery. She presented to Dr. Zhao in June 2022 for a second opinion who confirms the presence of one aneurysm, unclear about the alleged second aneurysm.  On 9/20/22, she underwent diagnostic cerebral angiogram which demonstrates unruptured bilateral ophthalmic artery aneurysms, - left ophthalmic artery aneurysm 4.1 x 3.4mm, right ophthalmic artery aneurysm 3.2 x 2.9mm, 2 small R ICA cavernous aneurysms 2.4mm and 1.9mm, severe fibromuscular dysplasia within the cervical segments of the bilateral internal carotid arteries with aneurysmal dilatation measuring as wide as 1.6 cm on the right and 1.2 cm on the left, and moderate fibromuscular dysplasia involving the bilateral vertebral arteries.  On 11/29/22, she underwent embolization of left ophthalmic aneurysm and left cervical carotid pseudoaneurysms with flow-diverting pipeline stents. 3 month MRA NOVA/MRI 4/19/23 showed good intracranial flow, no acute infarcts, stable fibromuscular dysplasia and untreated unruptured R cavernous carotid aneurysms. On 5/25/23, she underwent 6 month follow up cerebral angiogram demonstrating complete occlusion of previously embolized L ophthalmic artery aneurysm, near complete occlusion of previously embolized cervical and petrous L ICA dissecting pseudoaneurysms, no change in unruptured R ophthalmic and cavernous ICA aneurysms, no change in severe fibromuscular dysplasia and dissecting aneurysm within the L cervical and petrous ICA, unchanged moderate fibromuscular dysplasia within the V1, V2, and V3 segments of the b/l vertebral arteries.  PCP: Dr. Karen Grijalva Neurologist: Dr. Case  Today, patient presents for follow up to review results of brain MRI/MRA NOVA obtained on 6/21/24. No new major neurologic changes since our last visit. She is compliant with aspirin 325mg daily. Reports headaches with relief from Excedrin and Tylenol PM. Also reports left facial spasm only when speaking that resolved within a few weeks while in [place].

## 2024-11-27 ENCOUNTER — NON-APPOINTMENT (OUTPATIENT)
Age: 69
End: 2024-11-27

## 2024-11-27 ENCOUNTER — APPOINTMENT (OUTPATIENT)
Dept: CARDIOLOGY | Facility: CLINIC | Age: 69
End: 2024-11-27
Payer: COMMERCIAL

## 2024-11-27 VITALS
SYSTOLIC BLOOD PRESSURE: 157 MMHG | BODY MASS INDEX: 24.8 KG/M2 | OXYGEN SATURATION: 100 % | WEIGHT: 140 LBS | HEART RATE: 62 BPM | HEIGHT: 63 IN | DIASTOLIC BLOOD PRESSURE: 94 MMHG

## 2024-11-27 DIAGNOSIS — Z13.6 ENCOUNTER FOR SCREENING FOR CARDIOVASCULAR DISORDERS: ICD-10-CM

## 2024-11-27 DIAGNOSIS — M35.9 SYSTEMIC INVOLVEMENT OF CONNECTIVE TISSUE, UNSPECIFIED: ICD-10-CM

## 2024-11-27 DIAGNOSIS — I67.1 CEREBRAL ANEURYSM, NONRUPTURED: ICD-10-CM

## 2024-11-27 DIAGNOSIS — I71.20 THORACIC AORTIC ANEURYSM, WITHOUT RUPTURE, UNSPECIFIED: ICD-10-CM

## 2024-11-27 DIAGNOSIS — I72.0 ANEURYSM OF CAROTID ARTERY: ICD-10-CM

## 2024-11-27 PROCEDURE — 93000 ELECTROCARDIOGRAM COMPLETE: CPT

## 2024-11-27 PROCEDURE — G2211 COMPLEX E/M VISIT ADD ON: CPT

## 2024-11-27 PROCEDURE — 99205 OFFICE O/P NEW HI 60 MIN: CPT

## 2025-01-16 ENCOUNTER — APPOINTMENT (OUTPATIENT)
Dept: CV DIAGNOSITCS | Facility: HOSPITAL | Age: 70
End: 2025-01-16

## 2025-01-16 ENCOUNTER — OUTPATIENT (OUTPATIENT)
Dept: OUTPATIENT SERVICES | Facility: HOSPITAL | Age: 70
LOS: 1 days | End: 2025-01-16
Payer: COMMERCIAL

## 2025-01-16 ENCOUNTER — RESULT REVIEW (OUTPATIENT)
Age: 70
End: 2025-01-16

## 2025-01-16 DIAGNOSIS — Z13.6 ENCOUNTER FOR SCREENING FOR CARDIOVASCULAR DISORDERS: ICD-10-CM

## 2025-01-16 DIAGNOSIS — I71.20 THORACIC AORTIC ANEURYSM, WITHOUT RUPTURE, UNSPECIFIED: ICD-10-CM

## 2025-01-16 DIAGNOSIS — Z90.89 ACQUIRED ABSENCE OF OTHER ORGANS: Chronic | ICD-10-CM

## 2025-01-16 DIAGNOSIS — Z98.890 OTHER SPECIFIED POSTPROCEDURAL STATES: Chronic | ICD-10-CM

## 2025-01-16 PROCEDURE — 93306 TTE W/DOPPLER COMPLETE: CPT | Mod: 26

## 2025-01-21 ENCOUNTER — APPOINTMENT (OUTPATIENT)
Dept: CARDIOLOGY | Facility: CLINIC | Age: 70
End: 2025-01-21
Payer: COMMERCIAL

## 2025-01-21 DIAGNOSIS — Z13.6 ENCOUNTER FOR SCREENING FOR CARDIOVASCULAR DISORDERS: ICD-10-CM

## 2025-01-21 DIAGNOSIS — I72.0 ANEURYSM OF CAROTID ARTERY: ICD-10-CM

## 2025-01-21 DIAGNOSIS — I10 ESSENTIAL (PRIMARY) HYPERTENSION: ICD-10-CM

## 2025-01-21 DIAGNOSIS — M35.9 SYSTEMIC INVOLVEMENT OF CONNECTIVE TISSUE, UNSPECIFIED: ICD-10-CM

## 2025-01-21 DIAGNOSIS — I71.20 THORACIC AORTIC ANEURYSM, WITHOUT RUPTURE, UNSPECIFIED: ICD-10-CM

## 2025-01-21 PROCEDURE — G2211 COMPLEX E/M VISIT ADD ON: CPT | Mod: 95

## 2025-01-21 PROCEDURE — 99214 OFFICE O/P EST MOD 30 MIN: CPT | Mod: 95

## 2025-01-21 RX ORDER — VALSARTAN 160 MG/1
160 TABLET, COATED ORAL DAILY
Qty: 90 | Refills: 3 | Status: ACTIVE | COMMUNITY
Start: 2025-01-21

## 2025-01-21 RX ORDER — METOPROLOL SUCCINATE 50 MG/1
50 TABLET, EXTENDED RELEASE ORAL DAILY
Qty: 90 | Refills: 3 | Status: ACTIVE | COMMUNITY
Start: 2025-01-21

## 2025-03-31 ENCOUNTER — LABORATORY RESULT (OUTPATIENT)
Age: 70
End: 2025-03-31

## 2025-03-31 ENCOUNTER — NON-APPOINTMENT (OUTPATIENT)
Age: 70
End: 2025-03-31

## 2025-03-31 ENCOUNTER — APPOINTMENT (OUTPATIENT)
Dept: OBGYN | Facility: CLINIC | Age: 70
End: 2025-03-31
Payer: COMMERCIAL

## 2025-03-31 VITALS — SYSTOLIC BLOOD PRESSURE: 145 MMHG | WEIGHT: 140 LBS | DIASTOLIC BLOOD PRESSURE: 76 MMHG | BODY MASS INDEX: 24.8 KG/M2

## 2025-03-31 DIAGNOSIS — Z01.419 ENCOUNTER FOR GYNECOLOGICAL EXAMINATION (GENERAL) (ROUTINE) W/OUT ABNORMAL FINDINGS: ICD-10-CM

## 2025-03-31 PROCEDURE — 99397 PER PM REEVAL EST PAT 65+ YR: CPT

## 2025-06-01 ENCOUNTER — OUTPATIENT (OUTPATIENT)
Dept: OUTPATIENT SERVICES | Facility: HOSPITAL | Age: 70
LOS: 1 days | End: 2025-06-01
Payer: COMMERCIAL

## 2025-06-01 DIAGNOSIS — I67.1 CEREBRAL ANEURYSM, NONRUPTURED: ICD-10-CM

## 2025-06-01 DIAGNOSIS — I72.0 ANEURYSM OF CAROTID ARTERY: ICD-10-CM

## 2025-06-01 DIAGNOSIS — Z98.890 OTHER SPECIFIED POSTPROCEDURAL STATES: Chronic | ICD-10-CM

## 2025-06-01 DIAGNOSIS — Z90.89 ACQUIRED ABSENCE OF OTHER ORGANS: Chronic | ICD-10-CM

## 2025-06-01 PROCEDURE — 70544 MR ANGIOGRAPHY HEAD W/O DYE: CPT

## 2025-06-01 PROCEDURE — 70547 MR ANGIOGRAPHY NECK W/O DYE: CPT

## 2025-06-02 ENCOUNTER — NON-APPOINTMENT (OUTPATIENT)
Age: 70
End: 2025-06-02

## 2025-06-04 ENCOUNTER — NON-APPOINTMENT (OUTPATIENT)
Age: 70
End: 2025-06-04

## 2025-06-04 ENCOUNTER — APPOINTMENT (OUTPATIENT)
Dept: NEUROSURGERY | Facility: CLINIC | Age: 70
End: 2025-06-04
Payer: COMMERCIAL

## 2025-06-04 VITALS
HEART RATE: 61 BPM | DIASTOLIC BLOOD PRESSURE: 67 MMHG | WEIGHT: 142 LBS | HEIGHT: 63 IN | SYSTOLIC BLOOD PRESSURE: 122 MMHG | BODY MASS INDEX: 25.16 KG/M2 | OXYGEN SATURATION: 97 %

## 2025-06-04 DIAGNOSIS — I67.1 CEREBRAL ANEURYSM, NONRUPTURED: ICD-10-CM

## 2025-06-04 PROCEDURE — 99215 OFFICE O/P EST HI 40 MIN: CPT

## 2025-07-23 ENCOUNTER — APPOINTMENT (OUTPATIENT)
Dept: CARDIOLOGY | Facility: CLINIC | Age: 70
End: 2025-07-23